# Patient Record
Sex: FEMALE | Race: OTHER | HISPANIC OR LATINO
[De-identification: names, ages, dates, MRNs, and addresses within clinical notes are randomized per-mention and may not be internally consistent; named-entity substitution may affect disease eponyms.]

---

## 2019-03-13 ENCOUNTER — APPOINTMENT (OUTPATIENT)
Dept: GASTROENTEROLOGY | Facility: CLINIC | Age: 69
End: 2019-03-13
Payer: COMMERCIAL

## 2019-03-13 VITALS
TEMPERATURE: 98.7 F | OXYGEN SATURATION: 98 % | WEIGHT: 124 LBS | DIASTOLIC BLOOD PRESSURE: 74 MMHG | SYSTOLIC BLOOD PRESSURE: 132 MMHG | BODY MASS INDEX: 24.35 KG/M2 | HEART RATE: 85 BPM | HEIGHT: 60 IN

## 2019-03-13 DIAGNOSIS — Z86.018 PERSONAL HISTORY OF OTHER BENIGN NEOPLASM: ICD-10-CM

## 2019-03-13 DIAGNOSIS — Z78.9 OTHER SPECIFIED HEALTH STATUS: ICD-10-CM

## 2019-03-13 DIAGNOSIS — Z86.39 PERSONAL HISTORY OF OTHER ENDOCRINE, NUTRITIONAL AND METABOLIC DISEASE: ICD-10-CM

## 2019-03-13 DIAGNOSIS — Z87.09 PERSONAL HISTORY OF OTHER DISEASES OF THE RESPIRATORY SYSTEM: ICD-10-CM

## 2019-03-13 DIAGNOSIS — Z85.048 PERSONAL HISTORY OF OTHER MALIGNANT NEOPLASM OF RECTUM, RECTOSIGMOID JUNCTION, AND ANUS: ICD-10-CM

## 2019-03-13 DIAGNOSIS — Z86.79 PERSONAL HISTORY OF OTHER DISEASES OF THE CIRCULATORY SYSTEM: ICD-10-CM

## 2019-03-13 PROBLEM — Z00.00 ENCOUNTER FOR PREVENTIVE HEALTH EXAMINATION: Status: ACTIVE | Noted: 2019-03-13

## 2019-03-13 PROCEDURE — 99213 OFFICE O/P EST LOW 20 MIN: CPT

## 2019-03-13 RX ORDER — ATORVASTATIN CALCIUM 80 MG/1
TABLET, FILM COATED ORAL
Refills: 0 | Status: ACTIVE | COMMUNITY

## 2019-03-13 RX ORDER — VITAMIN B COMPLEX
CAPSULE ORAL
Refills: 0 | Status: ACTIVE | COMMUNITY

## 2019-03-13 RX ORDER — MIRTAZAPINE 7.5 MG/1
7.5 TABLET, FILM COATED ORAL
Refills: 0 | Status: ACTIVE | COMMUNITY

## 2019-03-13 RX ORDER — LOSARTAN POTASSIUM AND HYDROCHLOROTHIAZIDE 12.5; 1 MG/1; MG/1
TABLET ORAL
Refills: 0 | Status: ACTIVE | COMMUNITY

## 2019-03-13 RX ORDER — OXYBUTYNIN CHLORIDE 5 MG/1
5 TABLET ORAL
Refills: 0 | Status: ACTIVE | COMMUNITY

## 2019-03-13 RX ORDER — MULTIVITAMIN/IRON/FOLIC ACID 18MG-0.4MG
TABLET ORAL
Refills: 0 | Status: ACTIVE | COMMUNITY

## 2019-03-13 RX ORDER — FAMOTIDINE 40 MG/1
40 TABLET, FILM COATED ORAL
Refills: 0 | Status: ACTIVE | COMMUNITY

## 2019-03-13 RX ORDER — MONTELUKAST 10 MG/1
TABLET, FILM COATED ORAL
Refills: 0 | Status: ACTIVE | COMMUNITY

## 2019-03-13 RX ORDER — POLYETHYLENE GLYCOL 3350, SODIUM CHLORIDE, POTASSIUM CHLORIDE, SODIUM BICARBONATE, AND SODIUM SULFATE 240; 5.84; 2.98; 6.72; 22.72 G/4L; G/4L; G/4L; G/4L; G/4L
240 POWDER, FOR SOLUTION ORAL
Qty: 1 | Refills: 0 | Status: ACTIVE | COMMUNITY
Start: 2019-03-13 | End: 1900-01-01

## 2019-05-07 ENCOUNTER — RESULT REVIEW (OUTPATIENT)
Age: 69
End: 2019-05-07

## 2019-05-07 ENCOUNTER — APPOINTMENT (OUTPATIENT)
Dept: GASTROENTEROLOGY | Facility: HOSPITAL | Age: 69
End: 2019-05-07

## 2019-05-07 ENCOUNTER — OUTPATIENT (OUTPATIENT)
Dept: OUTPATIENT SERVICES | Facility: HOSPITAL | Age: 69
LOS: 1 days | End: 2019-05-07
Payer: COMMERCIAL

## 2019-05-07 DIAGNOSIS — R10.32 LEFT LOWER QUADRANT PAIN: ICD-10-CM

## 2019-05-07 DIAGNOSIS — Z85.048 PERSONAL HISTORY OF OTHER MALIGNANT NEOPLASM OF RECTUM, RECTOSIGMOID JUNCTION, AND ANUS: ICD-10-CM

## 2019-05-07 PROCEDURE — 45380 COLONOSCOPY AND BIOPSY: CPT

## 2019-05-07 PROCEDURE — 88305 TISSUE EXAM BY PATHOLOGIST: CPT | Mod: 26

## 2019-05-07 PROCEDURE — 88305 TISSUE EXAM BY PATHOLOGIST: CPT

## 2019-05-08 LAB — SURGICAL PATHOLOGY STUDY: SIGNIFICANT CHANGE UP

## 2019-05-14 ENCOUNTER — MESSAGE (OUTPATIENT)
Age: 69
End: 2019-05-14

## 2019-06-05 ENCOUNTER — APPOINTMENT (OUTPATIENT)
Dept: GASTROENTEROLOGY | Facility: CLINIC | Age: 69
End: 2019-06-05
Payer: COMMERCIAL

## 2019-06-05 VITALS
BODY MASS INDEX: 24.35 KG/M2 | WEIGHT: 124 LBS | SYSTOLIC BLOOD PRESSURE: 147 MMHG | DIASTOLIC BLOOD PRESSURE: 78 MMHG | HEIGHT: 60 IN | OXYGEN SATURATION: 97 % | TEMPERATURE: 98.4 F | HEART RATE: 74 BPM

## 2019-06-05 PROCEDURE — 99213 OFFICE O/P EST LOW 20 MIN: CPT

## 2019-06-05 NOTE — HISTORY OF PRESENT ILLNESS
[None] : had no significant interval events [Nausea] : denies nausea [Vomiting] : denies vomiting [Diarrhea] : denies diarrhea [Constipation] : denies constipation [Yellow Skin Or Eyes (Jaundice)] : denies jaundice [Rectal Pain] : denies rectal pain [Heartburn] : heartburn [Abdominal Pain] : abdominal pain [Abdominal Swelling] : abdominal swelling [GERD] : gastroesophageal reflux disease [Wt Gain ___ Lbs] : no recent weight gain [Wt Loss ___ Lbs] : no recent weight loss [Hiatus Hernia] : no hiatus hernia [Peptic Ulcer Disease] : no peptic ulcer disease [Pancreatitis] : no pancreatitis [Cholelithiasis] : no cholelithiasis [Kidney Stone] : no kidney stone [Inflammatory Bowel Disease] : no inflammatory bowel disease [Irritable Bowel Syndrome] : no irritable bowel syndrome [Diverticulitis] : no diverticulitis [Alcohol Abuse] : no alcohol abuse [Malignancy] : no malignancy [Abdominal Surgery] : no abdominal surgery [Appendectomy] : no appendectomy [Cholecystectomy] : no cholecystectomy [de-identified] : The patient states that she is feeling anxious. The patient denies any jaundice or pruritus.  The patient complains of chronic lower back pain. The patient complains of abdominal pain.  The patient describes the abdominal pain as a crampy, intermittent lower abdominal discomfort that is nonradiating in nature.  The abdominal pain is unrelated to meals or passing gas or having bowel movements.  The abdominal pain is described as being mild in nature.  The abdominal pain occurs at night and in the morning.  The abdominal pain can occur at any time.   The abdominal pain has never awakened the patient from sleep.  The abdominal pain is not relieved with medication.  The abdominal pain is associated with abdominal gas and bloating.  The patient denies any nausea or vomiting.  The patient complains of gastroesophageal reflux disease but denies any dysphagia.  The gastroesophageal reflux disease is worse after meals and late at night and in the early morning. The gastroesophageal reflux disease is improved with proton pump inhibitors, H2 blockers and antacids.   The patient complains of atypical chest pain but denies any shortness of breath or palpitations.  The patient was recently evaluated by her cardiologist in December 2018.  According to the patient, the stress test was unremarkable.  The chest pain is described as a pressure, intermittent substernal discomfort that is nonradiating in nature.  The patient denies any diaphoresis. The chest pain is described as being 5 out of 10 in intensity.  The chest pain can occur with ambulation.  The chest pain is worse during the day with exertion.  The chest pain is worse after certain meals (Plantains) .  The chest pain is unrelated to passing gas.  The chest pain has never awakened the patient from sleep.  The patient denies any constipation or diarrhea.  The patient has 1 to 2 bowel movements a day.  The patient denies a change in bowel habits.  The patient denies a change in caliber of stool.  The patient denies having mucus discharge with the bowel movements.  The patient complains of 1 episode of lower GI bleeding that resolved spontaneously.  She currently denies any bright red blood per rectum, melena or hematemesis.  The lower GI bleeding is associated with hemorrhoids.  The patient denies any rectal pain or rectal pruritus.  The patient complains of weight loss but denies any anorexia.  The patient admits to losing 6 pounds over the past 6 months.  She denies any fevers or chills.  The patient had a colonoscopy to the terminal ileum performed at the [facility] GI endoscopy suite on May 9, 2019.  The colonoscopy to the terminal ileum revealed mild cecal erythema, mild left-sided diverticulosis, mild radiation proctitis and small internal hemorrhoids and hypertrophied anal papilla versus anal polyps. No polyps, masses, or AVMs noted. Random biopsies were taken of the terminal ileum and cecum to assess for microscopic colitis the pathology revealed unremarkable small bowel mucosa and colonic mucosa with preserved crypt architecture with acute and chronic inflammatory cells no evidence of cryptitis or crypt abscesses. The patient tolerated the procedure well.  The patient is being followed by Dr. Washburn of oncology for the anal cancer, s/p chemotherapy and radiation therapy.  The patient is s/p radiatiion and chemotherapy (capecitabine) therapy for the anal (squamous cell carcinoma) cancer (stage T1N0M0).  The patient previously complained of ano-rectal discomfort, s/p radiation therapy.  The patient is be ng followed by Dr. Colleen Washburn of oncology and Dr. Ghassan Hess and Dr. Gelacio Bunch of radiation oncology.  The patient was also being evaluated by her gynecologist, Dr. Sasha Bal.  The perianal pain is much less on Calmoseptine cream.  The patient wastreated with a course of ciprofloxacin.  The urinalysis and urine culture were negative.  The patient was previously treated with iridium 200 mg 3 times a day, zinc oxide cream on the perianal skin and a lidocaine/Aquaphor mixture a temporary relief.  The patient had an upper endoscopy and flexible sigmoidoscopy to the splenic flexure performed in the office on February 16 2018.  The upper endoscopy revealed small hiatal hernia and mild diffuse bile gastritis.   The pathology revealed distal esophagus with squamous mucosa with changes suggestive of mild reflux, gastroesophageal reflux disease with no evidence of eosinophilic esophagtis or fungal microorganism, gastric antral mucosa with chronic inactive gastritis with no evidence of intestinal metaplasia or dysplasia that was negative for Helicobacter pylori, gastric body mucosa with chronic inactive gastritis with no evidence of intestinal metaplasia or dysplasia that was negative for Helicobacter pylori and duodenal mucosa with chronic active duodenitis with no evidence of intraepithelial lymphocytosis, celiac disease, Giardia or parasites.  The flexible sigmoidoscopy to the splenic flexure revealed rectal polyp, mild left side d diverticulosis, mild rectal erythema suggestive of mild proctitis and internal hemorrhoids.  The anal polyp was snared and removed.  There was no bleeding post polypectomy.  Random biopsies were also taken of the rectum to assess for proctitis.  There were no other polyps, masses or AVMs noted. 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  The patient had a colonoscopy to the terminal ileum performed at the office on January 26, 2016.  The colonoscopic findings revealed mild left sided diverticulosis and large internal hemorrhoids.  There were no polyps, masses, AVMs or colitis noted.  The patient tolerated the procedures well.  The patient denies any family history of GI problems.

## 2019-06-05 NOTE — ASSESSMENT
[FreeTextEntry1] : Abdominal Pain: The patient complains of abdominal pain. The patient is to avoid nonsteroidal anti-inflammatory drugs and aspirin.  I recommend a trial of Phazyme 1 tablet PO 3 times a day for 3 months for the symptoms.\par Dyspepsia: The patient complains of dyspeptic symptoms.  The patient was advised to abide by an anti-gas diet.  The patient was given a pamphlet for anti-gas.  The patient and I reviewed the anti-gas diet at length. The patient is to start on a trial of Phazyme one tablet 3 times a day p.r.n. abdominal pain and gas.\par GERD: The patient was advised to avoid late-night meals and dietary indiscretions.  The patient was advised to avoid fried and fatty foods.  The patient was advised to abide by an anti-GERD diet. The patient was given a pamphlet for anti-GERD.  The patient and I reviewed the anti-GERD diet at length. I recommend a trial of Pepcid 40 mg twice a day x 3 months for the symptoms.\par Atypical Chest Pain: The patient complains of atypical chest pain of unclear etiology.  The patient was advised to follow up with the PMD regarding evaluation for the atypical chest pain. The patient was told of possible etiologies such as cardiac, pulmonary, GI, musculoskeletal, stress and other causes for the atypical chest pain.  The patient agrees and will follow-up with the PMD. \par Rectal Bleeding: The patient had episodes of rectal bleeding.  The etiology of the rectal bleeding is unclear.  I recommend a trial of Anusol HC suppositories one per rectum QHS and Anusol HC 2.5% cream apply to affected area twice a day PRN hemorrhoidal bleeding or pain.  I recommend a trial of Calmoseptine cream apply to affected area twice a day for rectal discomfort. I recommend Tucks pads for the hemorrhoids.  I recommend starting Sitz baths twice a day for the hemorrhoids.  I recommend avoid wearing tight underwear and use boxers. I recommend avoid touching the perineal area.  I recommend a surgical evaluation for possible band ligation of the hemorrhoids. The patient was given the name of Dr. Bryan Briscoe for possible surgery. \par Diverticulosis: I recommend a high-fiber diet and avoid seeds. The patient is to consider a trial of Metamucil once a day for fiber supplementation. The patient is to also consider a trial of a probiotic such as Align once a day.\par Internal Hemorrhoids: The patient is to consider a trial of Anusol H. C. suppositories one per rectum nightly and Anusol HC2 .5% cream apply to affected area twice a day p.r.n. hemorrhoidal bleeding or pain.\par Radiation Proctitis:  The patient has a history of radiation proctitis.  If the bleeding persists, consider a trial of Canasa suppositories 1000mg QHS for possible radiation proctitis.\par The patient had hypertrophied anal papilla versus anal polyps noted on recent colonoscopy. I recommend a surgical evaluation with Dr. Bryan Briscoe.  The patient agrees and will follow-up.\par History of Colonic Polyp: The patient has a history of colonic polyps.  I recommend a repeat colonoscopy to reassess for colonic polyps. The patient was told of the risks and benefits of the procedure.  The patient was told of the risks of perforation, emergency surgery, bleeding, infections and missed lesions.  The patient agreed and will schedule for the procedure. The patient is to be n.p.o. after midnight and bowel prep was given.  The patient is to return for the procedure.\par Hiatal Hernia:  The patient was advised to avoid late-night meals and dietary indiscretions.  The patient was advised to avoid fried and fatty foods.  The patient was advised to abide by an anti-GERD diet. The patient was given a pamphlet for anti-GERD.  The patient and I reviewed the anti-GERD diet at length.\par Gastritis: The patient has a history of gastritis. The patient is to avoid nonsteroidal anti-inflammatory drugs and aspirin.  I recommend a trial of Famotidine 40 mg twice a day for 3 months for the symptoms.\par Duodenitis: The patient is to avoid nonsteroidal anti-inflammatory drugs and aspirin.\par The flexible sigmoidoscopy to the splenic flexure revealed rectal polyp, mild left sided diverticulosis, mild rectal erythema suggestive of mild proctitis and internal hemorrhoids.  The rectal polyp was snared and removed.  There was no bleeding post polypectomy.  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  The patient is being followed by Dr. 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  The patient is also being followed by radiation oncology, Dr. Ghassan Hess and Dr. Gelacio Bunch, s/pradiation therapy.  The patient completed the course of chemotherapy and radiation therapy for close to 1 year with no issues.  The patient was also evaluated by Dr. Bryan Briscoe for the squamous cell carcinoma.    The patient agrees to followup s/p treatment.\par I recommend follow up with gynecologist, oncologist and radiation oncologist.\par I recommend continue on a trial of Calmoseptine cream apply to affected area twice a day for the anal discomfort. \par I recommend continue on a trial of Famotidine 40 mg twice a day for 3 months for the symptoms. \par Follow-up: The patient is to follow-up in the office in 2 to 3 months to reassess the symptoms. The patient was told to call the office if any further problems.\par \par \par \par

## 2019-12-05 RX ORDER — FAMOTIDINE 40 MG/1
40 TABLET, FILM COATED ORAL TWICE DAILY
Qty: 180 | Refills: 3 | Status: ACTIVE | COMMUNITY
Start: 2019-03-13

## 2019-12-05 NOTE — HISTORY OF PRESENT ILLNESS
[None] : had no significant interval events [de-identified] : The patient states that she is feeling better. The patient denies any jaundice or pruritus.  The patient complains of chronic lower back pain. The patient complains of occasional abdominal pain.  The patient describes the abdominal pain as a crampy, intermittent left sided abdominal discomfort that is nonradiating in nature.  The abdominal pain is unrelated to meals or passing gas or having bowel movements.   The abdominal pain is described as being mild to moderate in nature.  The abdominal pain occurs during the day.  The abdominal pain can occur at any time.   The abdominal pain has never awakened the patient from sleep.  The abdominal pain is relieved with certain medication such as proton pump inhibitors, H2 blockers and antacids.  The abdominal pain is associated with abdominal gas and bloating.  The patient any nausea or vomiting.  The patient complains of gastroesophageal reflux disease but denies any dysphagia.  The gastroesophageal reflux disease is worse after meals and late at night and in the early morning. The gastroesophageal reflux disease is improved with proton pump inhibitors, H2 blockers and antacids.   The patient denies any atypical chest pain, shortness of breath or palpitations.  The patient denies any diaphoresis. The patient denies any constipation or diarrhea.  The patient has 1 to 2 bowel movements a day.    The patient denies a change in bowel habits.  The patient denies a change in caliber of stool.  The patient denies having mucus discharge with the bowel movements.  The patient complains of dark stools but denies any bright red blood per rectum, melena or hematemesis.  The patient complains of rectal pruritus but denies any rectal pain.  The patient denies any weight loss or anorexia.  She denies any fevers or chills.  The patient is being followed by Dr. Washburn of oncology for the anal cancer, s/p chemotherapy and radiation therapy.  The patient is s/p radiatiion and chemotherapy (capecitabine) therapy for the anal (squamous cell carcinoma) cancer (stage T1N0M0).  The patient previously complained of ano-rectal discomfort, s/p radiation therapy.  The patient is be ng followed by Dr. Colleen Washburn of oncology and Dr. Ghassan Hess and Dr. Gelacio Bunch of radiation oncology.  The patient was also being evaluated by her gynecologist, Dr. Sasha Bal.  The perianal pain is much less on Calmoseptine cream.  The patient was treated with a course of ciprofloxacin.The urinalysis and urine culture were negative.  The patient was previously treated with iridium 200 mg 3 times a day, zinc oxide cream on the perianal skin and a lidocaine/Aquaphor mixture a temporary relief.  The patient had an upper endoscopy and flexible sigmoidoscopy to the splenic flexure performed in the office on February 16 2018.  The upper endoscopy revealed small hiatal hernia and mild diffuse bile gastritis.   The pathology revealed distal esophagus with squamous mucosa with changes suggestive of mild reflux, gastroesophageal reflux disease with no evidence of eosinophilic esophagtis or fungal microorganism, gastric antral mucosa with chronic inactive gastritis with no evidence of intestinal metaplasia or dysplasia that was negative for Helicobacter pylori, gastric body mucosa with chronic inactive gastritis with no evidence of intestinal metaplasia or dysplasia that was negative for Helicobacter pylori and duodenal mucosa with chronic active duodenitis with no evidence of intraepithelial lymphocytosis, celiac disease, Giardia or parasites.  The flexible sigmoidoscopy to the splenic flexure revealed rectal polyp, mild left side d diverticulosis, mild rectal erythema suggestive of mild proctitis and internal hemorrhoids.  The anal polyp was snared and removed.  There was no bleeding post polypectomy.  Random biopsies were also taken of the rectum to assess for proctitis.  There were no other polyps, masses or AVMs noted. 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  The patient had a colonoscopy to the terminal ileum performed at the office on January 26, 2016.  The colonoscopic findings revealed mild left sided diverticulosis and large internal hemorrhoids.  There were no polyps, masses, AVMs or colitis noted.  The patient tolerated the procedures well.  The patient denies any family history of GI problems.

## 2019-12-05 NOTE — HISTORY OF PRESENT ILLNESS
[None] : had no significant interval events [de-identified] : The patient states that she is feeling better. The patient denies any jaundice or pruritus.  The patient complains of chronic lower back pain. The patient complains of occasional abdominal pain.  The patient describes the abdominal pain as a crampy, intermittent left sided abdominal discomfort that is nonradiating in nature.  The abdominal pain is unrelated to meals or passing gas or having bowel movements.   The abdominal pain is described as being mild to moderate in nature.  The abdominal pain occurs during the day.  The abdominal pain can occur at any time.   The abdominal pain has never awakened the patient from sleep.  The abdominal pain is relieved with certain medication such as proton pump inhibitors, H2 blockers and antacids.  The abdominal pain is associated with abdominal gas and bloating.  The patient any nausea or vomiting.  The patient complains of gastroesophageal reflux disease but denies any dysphagia.  The gastroesophageal reflux disease is worse after meals and late at night and in the early morning. The gastroesophageal reflux disease is improved with proton pump inhibitors, H2 blockers and antacids.   The patient denies any atypical chest pain, shortness of breath or palpitations.  The patient denies any diaphoresis. The patient denies any constipation or diarrhea.  The patient has 1 to 2 bowel movements a day.    The patient denies a change in bowel habits.  The patient denies a change in caliber of stool.  The patient denies having mucus discharge with the bowel movements.  The patient complains of dark stools but denies any bright red blood per rectum, melena or hematemesis.  The patient complains of rectal pruritus but denies any rectal pain.  The patient denies any weight loss or anorexia.  She denies any fevers or chills.  The patient is being followed by Dr. Washburn of oncology for the anal cancer, s/p chemotherapy and radiation therapy.  The patient is s/p radiatiion and chemotherapy (capecitabine) therapy for the anal (squamous cell carcinoma) cancer (stage T1N0M0).  The patient previously complained of ano-rectal discomfort, s/p radiation therapy.  The patient is be ng followed by Dr. Colleen Washburn of oncology and Dr. Ghassan Hess and Dr. Gelacio Bunch of radiation oncology.  The patient was also being evaluated by her gynecologist, Dr. Sasha Bal.  The perianal pain is much less on Calmoseptine cream.  The patient was treated with a course of ciprofloxacin.The urinalysis and urine culture were negative.  The patient was previously treated with iridium 200 mg 3 times a day, zinc oxide cream on the perianal skin and a lidocaine/Aquaphor mixture a temporary relief.  The patient had an upper endoscopy and flexible sigmoidoscopy to the splenic flexure performed in the office on February 16 2018.  The upper endoscopy revealed small hiatal hernia and mild diffuse bile gastritis.   The pathology revealed distal esophagus with squamous mucosa with changes suggestive of mild reflux, gastroesophageal reflux disease with no evidence of eosinophilic esophagtis or fungal microorganism, gastric antral mucosa with chronic inactive gastritis with no evidence of intestinal metaplasia or dysplasia that was negative for Helicobacter pylori, gastric body mucosa with chronic inactive gastritis with no evidence of intestinal metaplasia or dysplasia that was negative for Helicobacter pylori and duodenal mucosa with chronic active duodenitis with no evidence of intraepithelial lymphocytosis, celiac disease, Giardia or parasites.  The flexible sigmoidoscopy to the splenic flexure revealed rectal polyp, mild left side d diverticulosis, mild rectal erythema suggestive of mild proctitis and internal hemorrhoids.  The anal polyp was snared and removed.  There was no bleeding post polypectomy.  Random biopsies were also taken of the rectum to assess for proctitis.  There were no other polyps, masses or AVMs noted. 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  The patient had a colonoscopy to the terminal ileum performed at the office on January 26, 2016.  The colonoscopic findings revealed mild left sided diverticulosis and large internal hemorrhoids.  There were no polyps, masses, AVMs or colitis noted.  The patient tolerated the procedures well.  The patient denies any family history of GI problems.

## 2020-01-17 ENCOUNTER — APPOINTMENT (OUTPATIENT)
Dept: GASTROENTEROLOGY | Facility: CLINIC | Age: 70
End: 2020-01-17
Payer: COMMERCIAL

## 2020-01-17 VITALS
DIASTOLIC BLOOD PRESSURE: 67 MMHG | HEART RATE: 74 BPM | HEIGHT: 60 IN | SYSTOLIC BLOOD PRESSURE: 131 MMHG | WEIGHT: 124 LBS | OXYGEN SATURATION: 98 % | TEMPERATURE: 98.3 F | BODY MASS INDEX: 24.35 KG/M2

## 2020-01-17 PROCEDURE — 99213 OFFICE O/P EST LOW 20 MIN: CPT

## 2020-01-17 RX ORDER — MESALAMINE 1000 MG/1
1000 SUPPOSITORY RECTAL
Qty: 30 | Refills: 3 | Status: ACTIVE | COMMUNITY
Start: 2020-01-17 | End: 1900-01-01

## 2020-01-17 RX ORDER — FAMOTIDINE 40 MG/1
40 TABLET, FILM COATED ORAL
Qty: 60 | Refills: 3 | Status: ACTIVE | COMMUNITY
Start: 2020-01-17 | End: 1900-01-01

## 2020-01-17 NOTE — HISTORY OF PRESENT ILLNESS
[None] : had no significant interval events [Vomiting] : denies vomiting [Diarrhea] : denies diarrhea [Nausea] : denies nausea [Yellow Skin Or Eyes (Jaundice)] : denies jaundice [Constipation] : denies constipation [Rectal Pain] : denies rectal pain [Wt Loss ___ Lbs] : recent [unfilled] ~Upound(s) weight loss [Abdominal Pain] : abdominal pain [Heartburn] : heartburn [Abdominal Swelling] : abdominal swelling [GERD] : gastroesophageal reflux disease [Hiatus Hernia] : hiatus hernia [Malignancy] : malignancy [Wt Gain ___ Lbs] : no recent weight gain [Peptic Ulcer Disease] : no peptic ulcer disease [Pancreatitis] : no pancreatitis [Cholelithiasis] : no cholelithiasis [Kidney Stone] : no kidney stone [Inflammatory Bowel Disease] : no inflammatory bowel disease [Diverticulitis] : no diverticulitis [Irritable Bowel Syndrome] : no irritable bowel syndrome [Alcohol Abuse] : no alcohol abuse [Appendectomy] : no appendectomy [Cholecystectomy] : no cholecystectomy [Abdominal Surgery] : no abdominal surgery [de-identified] : The patient states that she is feeling uncomfortable x 1month. The patient denies any jaundice or pruritus.  The patient complains of chronic lower back pain. The patient complains of abdominal pain.  The patient describes the abdominal pain as a crampy, intermittent lower abdominal discomfort that occasionally radiates to the back.  The abdominal pain is unrelated to meals or passing gas or having bowel movements.  The abdominal pain is described as being mild in nature.  The abdominal pain occurs at night and in the morning.  The abdominal pain can occur at any time.   The abdominal pain has never awakened the patient from sleep.  The abdominal pain is slightly relieved with certain medication such as Gas X.  The abdominal pain is associated with abdominal gas and bloating.  The patient denies any nausea or vomiting.  The patient complains of occasional gastroesophageal reflux disease but denies any dysphagia.  The gastroesophageal reflux disease is worse after meals and late at night and in the early morning. The gastroesophageal reflux disease is improved with proton pump inhibitors, H2 blockers and antacids.   The patient complains of palpitations but denies any atypical chest pain or shortness of breath.  The patient was evaluated by her cardiologist for the atypical chest pain.  The patient states that the stress test and cardiac workup was stable.   The patient denies any diaphoresis. The patient denies any constipation or diarrhea.  The patient has 1 to 2 bowel movements a day.  The patient denies a change in bowel habits.  The patient denies a change in caliber of stool.  The patient admits to having mucus discharge with the bowel movements.  The patient complains of lower GI bleeding but denies any melena or hematemesis.  The lower GI bleeding is associated with radiation proctitis.  The patient denies any rectal pain or rectal pruritus.  The patient complains of weight loss but denies any anorexia.  The patient admits to losing 5 pounds over the past 3 months.  She denies any fevers or chills.  The patient had a colonoscopy to the terminal ileum performed at the [facility] GI endoscopy suite on May 9, 2019. The colonoscopy to the terminal ileum revealed mild cecal erythema, mild left-sided diverticulosis, mild radiation proctitis and small internal hemorrhoids and hypertrophied anal papilla versus anal polyps. No polyps, masses, or AVMs noted. Random biopsies were taken of the terminal ileum and cecum to assess for microscopic colitis the pathology revealed unremarkable small bowel mucosa and colonic mucosa with preserved crypt architecture with acute and chronic inflammatory cells no evidence of cryptitis or crypt abscesses. The patient tolerated the procedure well. The patient is being followed by Dr. Washburn of oncology for the anal cancer, s/p chemotherapy and radiation therapy. The patient is s/p radiatiion and chemotherapy (capecitabine) therapy for the anal (squamous cell carcinoma) cancer (stage T1N0M0). The patient previously complained of ano-rectal discomfort, s/p radiation therapy. The patient is being followed by Dr. Colleen Washburn of oncology and Dr. Ghassan Hess and Dr. Gelacio Bunch of radiation oncology.  The patient was recently evaluated by Dr. Bryan Briscoe of surgery.  According to the patient, the anal biopsies was stable.   The patient was also being evaluated by her gynecologist, Dr. Sasha Bal. The perianal pain is much less on Calmoseptine cream. The patient was treated with a course of ciprofloxacin. The urinalysis and urine culture were negative. The patient was previously treated with iridium 200 mg 3 times a day, zinc oxide cream on the perianal skin and a lidocaine/Aquaphor mixture a temporary relief. The patient had an upper endoscopy and flexible sigmoidoscopy to the splenic flexure performed in the office on February 16 2018. The upper endoscopy revealed small hiatal hernia and mild diffuse bile gastritis. The pathology revealed distal esophagus with squamous mucosa with changes suggestive of mild reflux, gastroesophageal reflux disease with no evidence of eosinophilic esophagtis or fungal microorganism, gastric antral mucosa with chronic inactive gastritis with no evidence of intestinal metaplasia or dysplasia that was negative for Helicobacter pylori, gastric body mucosa with chronic inactive gastritis with no evidence of intestinal metaplasia or dysplasia that was negative for Helicobacter pylori and duodenal mucosa with chronic active duodenitis with no evidence of intraepithelial lymphocytosis, celiac disease, Giardia or parasites. The flexible sigmoidoscopy to the splenic flexure revealed rectal polyp, mild left side d diverticulosis, mild rectal erythema suggestive of mild proctitis and internal hemorrhoids. The anal polyp was snared and removed. There was no bleeding post polypectomy. Random biopsies were also taken of the rectum to assess for proctitis. There were no other polyps, masses or AVMs noted. 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 The patient had a colonoscopy to the terminal ileum performed at the office on January 26, 2016. The colonoscopic findings revealed mild left sided diverticulosis and large internal hemorrhoids. There were no polyps, masses, AVMs or colitis noted. The patient tolerated the procedures well. The patient denies any family history of GI problems.

## 2020-01-17 NOTE — ASSESSMENT
[FreeTextEntry1] : Abdominal Pain: The patient complains of abdominal pain. The patient is to avoid nonsteroidal anti-inflammatory drugs and aspirin.  I recommend a trial of Phazyme 1 tablet PO 3 times a day for 3 months for the symptoms.\par Dyspepsia: The patient complains of dyspeptic symptoms.  The patient was advised to abide by an anti-gas diet.  The patient was given a pamphlet for anti-gas.  The patient and I reviewed the anti-gas diet at length. The patient is to start on a trial of Phazyme one tablet 3 times a day p.r.n. abdominal pain and gas.\par GERD: The patient was advised to avoid late-night meals and dietary indiscretions.  The patient was advised to avoid fried and fatty foods.  The patient was advised to abide by an anti-GERD diet. The patient was given a pamphlet for anti-GERD.  The patient and I reviewed the anti-GERD diet at length. I recommend a trial of Pepcid 40 mg twice a day x 3 months for the symptoms.\par Diverticulosis: I recommend a high-fiber diet and avoid seeds. The patient is to consider a trial of Metamucil once a day for fiber supplementation. The patient is to also consider a trial of a probiotic such as Align once a day.\par Internal Hemorrhoids: The patient is to consider a trial of Anusol H. C. suppositories one per rectum nightly and Anusol HC2.5% cream apply to affected area twice a day p.r.n. hemorrhoidal bleeding or pain.\par Radiation Proctitis: The patient has a history of radiation proctitis. If the bleeding persists, consider a trial of Canasa suppositories 1000mg QHS for possible radiation proctitis.\par The patient had hypertrophied anal papilla versus anal polyps noted on recent colonoscopy. The patient had a surgical evaluation with Dr. Bryan Briscoe. The patient was told that the pathology revealed evidence of radiation proctitis.  The patient agrees and will follow-up.\par History of Colonic Polyp: The patient has a history of colonic polyps. I recommend a repeat colonoscopy in 3 years to reassess for colonic polyps unless symptomatic.  The patient agreed and will follow-up for the procedure. \par Hiatal Hernia: The patient was advised to avoid late-night meals and dietary indiscretions. The patient was advised to avoid fried and fatty foods. The patient was advised to abide by an anti-GERD diet. The patient was given a pamphlet for anti-GERD. The patient and I reviewed the anti-GERD diet at length.\par Gastritis: The patient has a history of gastritis. The patient is to avoid nonsteroidal anti-inflammatory drugs and aspirin. I recommend a trial of Famotidine 40 mg twice a day for 3 months for the symptoms.\par Duodenitis: The patient is to avoid nonsteroidal anti-inflammatory drugs and aspirin.\par The flexible sigmoidoscopy to the splenic flexure revealed rectal polyp, mild left sided diverticulosis, mild rectal erythema suggestive of mild proctitis and internal hemorrhoids. The rectal polyp was snared and removed. There was no bleeding post polypectomy. 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 The patient is being followed by Dr. 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 The patient is also being followed by radiation oncology, Dr. Ghassan Hess and Dr. Gelacio Bunch, s/p radiation therapy. The patient completed the course of chemotherapy and radiation therapy for close to 1 year with no issues. The patient was also evaluated by Dr. Bryan Briscoe for the squamous cell carcinoma. The patient agrees to followup s/p treatment.\par I recommend continue follow up with gynecologist, oncologist and radiation oncologist.\par I recommend continue on a trial of Calmoseptine cream apply to affected area twice a day for the anal discomfort. \par I recommend continue on a trial of Famotidine 40 mg twice a day for 3 months for the symptoms. \par Follow-up: The patient is to follow-up in the office in  3 months to reassess the symptoms. The patient was told to call the office if any further problems.\par \par \par

## 2020-05-20 ENCOUNTER — APPOINTMENT (OUTPATIENT)
Dept: GASTROENTEROLOGY | Facility: CLINIC | Age: 70
End: 2020-05-20
Payer: COMMERCIAL

## 2020-05-20 VITALS
HEART RATE: 81 BPM | WEIGHT: 121 LBS | BODY MASS INDEX: 23.75 KG/M2 | HEIGHT: 60 IN | OXYGEN SATURATION: 98 % | TEMPERATURE: 99 F | DIASTOLIC BLOOD PRESSURE: 74 MMHG | SYSTOLIC BLOOD PRESSURE: 131 MMHG

## 2020-05-20 DIAGNOSIS — R19.7 DIARRHEA, UNSPECIFIED: ICD-10-CM

## 2020-05-20 PROCEDURE — 99214 OFFICE O/P EST MOD 30 MIN: CPT

## 2020-05-20 RX ORDER — FAMOTIDINE 40 MG/1
40 TABLET, FILM COATED ORAL TWICE DAILY
Qty: 180 | Refills: 3 | Status: ACTIVE | COMMUNITY
Start: 2020-05-20 | End: 1900-01-01

## 2020-05-20 NOTE — HISTORY OF PRESENT ILLNESS
[None] : had no significant interval events [Nausea] : denies nausea [Vomiting] : denies vomiting [Constipation] : denies constipation [Abdominal Pain] : denies abdominal pain [Rectal Pain] : denies rectal pain [Yellow Skin Or Eyes (Jaundice)] : denies jaundice [Wt Loss ___ Lbs] : recent [unfilled] ~Upound(s) weight loss [Diarrhea] : diarrhea [Heartburn] : heartburn [GERD] : gastroesophageal reflux disease [Abdominal Swelling] : abdominal swelling [Wt Gain ___ Lbs] : no recent weight gain [Hiatus Hernia] : no hiatus hernia [Peptic Ulcer Disease] : no peptic ulcer disease [Cholelithiasis] : no cholelithiasis [Pancreatitis] : no pancreatitis [Inflammatory Bowel Disease] : no inflammatory bowel disease [Kidney Stone] : no kidney stone [Irritable Bowel Syndrome] : no irritable bowel syndrome [Diverticulitis] : no diverticulitis [Alcohol Abuse] : no alcohol abuse [Abdominal Surgery] : no abdominal surgery [Malignancy] : no malignancy [Cholecystectomy] : no cholecystectomy [Appendectomy] : no appendectomy [de-identified] : The MRCP with and without IV contrast performed on February 21, 2020 revealed small (8 mm) pancreatic head cystic lesion. Also noted were 2 small liver cysts.  The CAT scan of the abdomen and pelvis with IV contrast performed on February 17, 2020 revealed limited CT assessment of heterogeneous density in the head and uncinate process of the pancreas especially for underlying small cystic lesions in the background of fatty infiltration.  The abdominal ultrasound performed on February 6, 2020 revealed a limited study due to the patient's body habitus.  There was a suspicious 0.8 x 0.5 x 0.9 cm hypoechoic mass in the head of the pancreas. Also noted was increased echogenicity within the liver indicative of fatty infiltration or diffuse hepatocellular disease. The spleen was not satisfactorily visualized due to the patient's body habitus. The pelvic ultrasound performed on February 6, 2020 revealed no uterine or adnexal masses. [de-identified] : The patient states that she is feeling better.  The patient denies any exposure to COVID 19 infection.  The patient denies any viral illness.  The patient denies any prior exposure to hepatitis A, B or C.  The patient denies any large doses of nonsteroidal anti-inflammatory drugs or acetaminophen.   The patient denies sharing needles, razors, nail clippers, nail files, scissors, et cetera.  The patient denies any EtOH abuse, cocaine use or intravenous drug use.   The patient denies any prior blood transfusions, sexual indiscretions, tattoos or piercing.  The patient admits to having prior surgery.  The history of surgery is significant for a prior uterine myomectomy and .  The patient denies any family history of GI or liver problems.   The patient denies any jaundice or pruritus.  The patient complains of chronic lower back pain. The patient denies any abdominal pain.  The patient complains of abdominal gas and bloating.  The patient denies any nausea or vomiting.  The patient complains of dry mouth and occasional gastroesophageal reflux disease but denies any dysphagia.  The gastroesophageal reflux disease is worse after meals and late at night and in the early morning. The gastroesophageal reflux disease is improved with proton pump inhibitors, H2 blockers and antacids.   The patient complains of atypical chest pain and shortness of breath secondary to asthma but denies any palpitations.  The chest pain is described as a pressure, intermittent substernal discomfort that is nonradiating in nature.  The patient denies any diaphoresis. The chest pain is described as being 5 to 6 out of 10 in intensity.  The chest pain can occur at any time.  The chest pain is worse at night and early morning.  The chest pain is worse after meals and improves with passing gas or having bowel movements.  The chest pain is unrelated to meals and passing gas.  The chest pain has never awakened the patient from sleep.  The patient complains of occasional diarrhea but denies any constipation.  The patient has 1 to 3 bowel movements a day.  The patient complains of a change in bowel habits.  The patient complains of a change in caliber of stool.   The diarrhea is described as soft in nature.  The patient denies having mucus discharge with the bowel movements.  The patient previously complained of lower GI bleeding that resolved spontaneously.  She currently denies any bright red blood per rectum, melena or hematemesis.  The lower GI bleeding was associated with diarrhea and radiation proctitis.  The patient denies any rectal pain or rectal pruritus.  The patient complains of weight loss but denies any anorexia.  The patient admits to losing 10 pounds over the past 2 years.  She attributed the weight loss to change in diet and history of cancer.  She denies any fevers or chills.  The MRCP with and without IV contrast performed on 2020 revealed small (8 mm) pancreatic head cystic lesion. Also noted were 2 small liver cysts.  The CAT scan of the abdomen and pelvis with IV contrast performed on 2020 revealed limited CT assessment of heterogeneous density in the head and uncinate process of the pancreas especially for underlying small cystic lesions in the background of fatty infiltration.  The abdominal ultrasound performed on 2020 revealed a limited study due to the patient's body habitus.  There was a suspicious 0.8 x 0.5 x 0.9 cm hypoechoic mass in the head of the pancreas. Also noted was increased echogenicity within the liver indicative of fatty infiltration or diffuse hepatocellular disease. The spleen was not satisfactorily visualized due to the patient's body habitus. The pelvic ultrasound performed on 2020 revealed no uterine or adnexal masses. The patient had a colonoscopy to the terminal ileum performed at the [facility] GI endoscopy suite on May 9, 2019. The colonoscopy to the terminal ileum revealed mild cecal erythema, mild left-sided diverticulosis, mild radiation proctitis and small internal hemorrhoids and hypertrophied anal papilla versus anal polyps. No polyps, masses, or AVMs noted. Random biopsies were taken of the terminal ileum and cecum to assess for microscopic colitis the pathology revealed unremarkable small bowel mucosa and colonic mucosa with preserved crypt architecture with acute and chronic inflammatory cells no evidence of cryptitis or crypt abscesses. The patient tolerated the procedure well. The patient is being followed by Dr. Washubrn of oncology for the anal cancer, s/p chemotherapy and radiation therapy. The patient is s/p radiatiion and chemotherapy (capecitabine) therapy for the anal (squamous cell carcinoma) cancer (stage T1N0M0). The patient previously complained of ano-rectal discomfort, s/p radiation therapy. The patient is being followed by Dr. Colleen Washburn of oncology and Dr. Ghassan Hess and Dr. Gelacio Bunch of radiation oncology. The patient is being followed by Dr. Bryan Briscoe of surgery. According to the patient, the anal biopsies was stable. The patient was also being evaluated by her gynecologist, Dr. Sasha Bal. The perianal pain previously improved on Calmoseptine cream. The patient was treated with a course of ciprofloxacin. The urinalysis and urine culture were negative. The patient was previously treated with iridium 200 mg 3 times a day, zinc oxide cream on the perianal skin and a lidocaine/Aquaphor mixture a temporary relief. The patient had an upper endoscopy and flexible sigmoidoscopy to the splenic flexure performed in the office on 2018. The upper endoscopy revealed small hiatal hernia and mild diffuse bile gastritis. The pathology revealed distal esophagus with squamous mucosa with changes suggestive of mild reflux, gastroesophageal reflux disease with no evidence of eosinophilic esophagtis or fungal microorganism, gastric antral mucosa with chronic inactive gastritis with no evidence of intestinal metaplasia or dysplasia that was negative for Helicobacter pylori, gastric body mucosa with chronic inactive gastritis with no evidence of intestinal metaplasia or dysplasia that was negative for Helicobacter pylori and duodenal mucosa with chronic active duodenitis with no evidence of intraepithelial lymphocytosis, celiac disease, Giardia or parasites. The flexible sigmoidoscopy to the splenic flexure revealed rectal polyp, mild left side d diverticulosis, mild rectal erythema suggestive of mild proctitis and internal hemorrhoids. The anal polyp was snared and removed. There was no bleeding post polypectomy. Random biopsies were also taken of the rectum to assess for proctitis. There were no other polyps, masses or AVMs noted. 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 The patient had a colonoscopy to the terminal ileum performed at the office on 2016. The colonoscopic findings revealed mild left sided diverticulosis and large internal hemorrhoids. There were no polyps, masses, AVMs or colitis noted. The patient tolerated the procedures well. The patient denies any family history of GI problems.

## 2020-05-20 NOTE — ASSESSMENT
[FreeTextEntry1] : Dyspepsia: The patient complains of dyspeptic symptoms.  The patient was advised to abide by an anti-gas diet.  The patient was given a pamphlet for anti-gas.  The patient and I reviewed the anti-gas diet at length. The patient is to start on a trial of Phazyme one tablet 3 times a day p.r.n. abdominal pain and gas.\par GERD: The patient was advised to avoid late-night meals and dietary indiscretions.  The patient was advised to avoid fried and fatty foods.  The patient was advised to abide by an anti-GERD diet. The patient was given a pamphlet for anti-GERD.  The patient and I reviewed the anti-GERD diet at length. I recommend a trial of Pepcid 40 mg twice a day x 3 months for the symptoms.\par Atypical Chest Pain: The patient complains of atypical chest pain of unclear etiology.  The patient was advised to follow up with the PMD and cardiologist regarding evaluation for the atypical chest pain. The patient was told of possible etiologies such as cardiac, pulmonary, GI, musculoskeletal, stress and other causes for the atypical chest pain.  The patient agrees and will follow-up with the PMD and cardiologist. \par Diarrhea: The patient complains of diarrhea.  I recommend a low residue diet. The patient is to avoid fiber supplementation. The patient is to consider starting a trial of a probiotic such as Align once a day.   If the symptoms persist, the patient may require sending stool studies for C+S, O+P x3, and C. difficile to assess for an infectious etiology of the diarrhea.  The symptoms are worse after meals.  If the symptoms persist, the patient may require a trial of cholestyramine one packet once a day for possible bile induced diarrhea.   The patient agreed and will follow-up to reassess the symptoms.  \par Diverticulosis: I recommend a low fiber diet and avoid seeds. The patient is to avoid Metamucil or fiber supplementation. The patient is to also consider a trial of a probiotic such as Align once a day.\par Internal Hemorrhoids: The patient is to consider a trial of Anusol H. C. suppositories one per rectum nightly and Anusol HC2.5% cream apply to affected area twice a day p.r.n. hemorrhoidal bleeding or pain.\par Radiation Proctitis: The patient has a history of radiation proctitis. If the bleeding persists, consider a trial of Canasa suppositories 1000mg QHS for possible radiation proctitis.\par The patient had hypertrophied anal papilla versus anal polyps noted on recent colonoscopy. The patient had a surgical evaluation with Dr. Bryan Briscoe. The patient was told that the pathology revealed evidence of radiation proctitis. The patient agrees and will follow-up.\par History of Colonic Polyp: The patient has a history of colonic polyps. I recommend a repeat colonoscopy in 3 years to reassess for colonic polyps unless symptomatic. The patient agreed and will follow-up for the procedure. \par Hiatal Hernia: The patient was advised to avoid late-night meals and dietary indiscretions. The patient was advised to avoid fried and fatty foods. The patient was advised to abide by an anti-GERD diet. The patient was given a pamphlet for anti-GERD. The patient and I reviewed the anti-GERD diet at length.\par Gastritis: The patient has a history of gastritis. The patient is to avoid nonsteroidal anti-inflammatory drugs and aspirin. I recommend continue on a trial of Famotidine 40 mg twice a day for 3 months for the symptoms.\par Duodenitis: The patient is to avoid nonsteroidal anti-inflammatory drugs and aspirin.\par The flexible sigmoidoscopy to the splenic flexure revealed rectal polyp, mild left sided diverticulosis, mild rectal erythema suggestive of mild proctitis and internal hemorrhoids. The rectal polyp was snared and removed. There was no bleeding post polypectomy. 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 The patient is being followed by Dr. 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 The patient is also being followed by radiation oncology, Dr. Ghassan Hess and Dr. Gelacio Bunch, s/p radiation therapy. The patient completed the course of chemotherapy and radiation therapy for close to 1 year with no issues. The patient was also evaluated by Dr. Bryan Briscoe for the squamous cell carcinoma. The patient agrees to followup s/p treatment.\par I recommend continue follow up with gynecologist, oncologist and radiation oncologist.\par Fatty Liver:  The patient had an imaging study suggestive of fatty infiltration of the liver.  The patient denies any jaundice or pruritus.  The patient denies any alcohol use.  The patient denies taking large doses of nonsteroidal anti-inflammatory drugs or acetaminophen.  The findings are suggestive of fatty liver.  The patient and I had a long discussion regarding the risks of fatty liver and possible progression to cirrhosis.  The patient was told of the possible increased risk of developing liver failure, cirrhosis, ascites, GI bleeding secondary to varices, hepatic encephalopathy, bleeding tendencies and liver cancer.  The patient was told of the importance of follow-up.  The patient was advised to follow up every 6 months for blood work and imaging studies. The patient agreed and will follow up. The patient was advised to lose weight. I recommend a trial of vitamin E supplementation for the fatty liver.  If the liver enzymes remain elevated, the patient may require a trial of Pioglitazone for the fatty liver. I recommend avoid alcohol and hepato-toxic agents.  The patient was also advised to avoid NSAIDs, Acetominophen and any other hepatotoxic drugs. The patient was also advised not to share needles, razors, scissors, nail clippers, etc.. The patient is to continue close follow-up in our office for blood work and exams.  If the liver enzymes remain elevated, the patient may require a CT guided liver biopsy to assess the liver parenchyma for possible treatment.  We had a long discussion regarding the risks and benefits of the procedure.  The patient was told of the risks of bleeding, perforation, infections, emergency surgery and missing lesions.  The patient agreed and will follow-up to reassess the symptoms.  \par Pancreatic Cystic Lesion:  The patient was recently diagnosed with a pancreatic cystic lesion on recent CAT scan of the abdomen and pelvis with IV contrast, abdominal ultrasound and MRCP with and without IV contrast.  The patient denies any jaundice, pruritus or back pain.  The patient also denies any abdominal pain.  The patient denies any prior history of EtOH abuse.  I recommend an MRI of the pancreas in one year to reassess the pancreatic cystic lesions.  The patient and I had a long discussion regarding the potential risks of pancreatic cysts progressing to pancreatic cancer.  If the MRI reveals a change in the pancreatic cystic lesions, the patient may require an endoscopic ultrasound of pancreas with possible fine-needle aspiration to better assess the pancreatic cystic lesions.  The patient is aware of the importance for followup.  The patient agrees and will followup.\par I recommend continue on a trial of Calmoseptine cream apply to affected area twice a day for the anal discomfort. \par I recommend continue on a trial of Famotidine 40 mg twice a day for 3 months for the symptoms. \par Follow-up: The patient is to follow-up in the office in 3 months to reassess the symptoms. The patient was told to call the office if any further problems.\par \par \par \par

## 2021-01-22 ENCOUNTER — APPOINTMENT (OUTPATIENT)
Dept: GASTROENTEROLOGY | Facility: CLINIC | Age: 71
End: 2021-01-22
Payer: COMMERCIAL

## 2021-01-22 ENCOUNTER — TRANSCRIPTION ENCOUNTER (OUTPATIENT)
Age: 71
End: 2021-01-22

## 2021-01-22 VITALS
BODY MASS INDEX: 23.16 KG/M2 | DIASTOLIC BLOOD PRESSURE: 70 MMHG | WEIGHT: 118 LBS | OXYGEN SATURATION: 96 % | HEART RATE: 83 BPM | SYSTOLIC BLOOD PRESSURE: 128 MMHG | TEMPERATURE: 97.5 F | HEIGHT: 60 IN

## 2021-01-22 PROCEDURE — 99072 ADDL SUPL MATRL&STAF TM PHE: CPT

## 2021-01-22 PROCEDURE — 99212 OFFICE O/P EST SF 10 MIN: CPT

## 2021-01-22 NOTE — ASSESSMENT
[FreeTextEntry1] : Dyspepsia: The patient complains of dyspeptic symptoms.  The patient was advised to abide by an anti-gas diet.  The patient was given a pamphlet for anti-gas.  The patient and I reviewed the anti-gas diet at length. The patient is to start on a trial of Phazyme one tablet 3 times a day p.r.n. abdominal pain and gas.\par GERD: The patient was advised to avoid late-night meals and dietary indiscretions.  The patient was advised to avoid fried and fatty foods.  The patient was advised to abide by an anti-GERD diet. The patient was given a pamphlet for anti-GERD.  The patient and I reviewed the anti-GERD diet at length. I recommend a trial of Pepcid 40 mg twice a day x 3 months for the symptoms.\par Atypical Chest Pain: The patient complains of atypical chest pain of unclear etiology.  The patient was advised to follow up with the PMD and cardiologist regarding evaluation for the atypical chest pain. The patient was told of possible etiologies such as cardiac, pulmonary, GI, musculoskeletal, stress and other causes for the atypical chest pain.  The patient agrees and will follow-up with the PMD and cardiologist. \par Imaging Study: I recommend an imaging study to assess the symptoms. I recommend an abdominal ultrasound to assess the liver parenchyma and for liver lesions.\par Diverticulosis: I recommend a low fiber diet and avoid seeds. The patient is to avoid Metamucil or fiber supplementation. The patient is to also consider a trial of a probiotic such as Align once a day.\par Internal Hemorrhoids: The patient is to consider a trial of Anusol H. C. suppositories one per rectum nightly and Anusol HC2.5% cream apply to affected area twice a day p.r.n. hemorrhoidal bleeding or pain.\par Radiation Proctitis: The patient has a history of radiation proctitis. If the bleeding persists, consider a trial of Canasa suppositories 1000mg QHS for possible radiation proctitis.\par The patient had hypertrophied anal papilla versus anal polyps noted on recent colonoscopy. The patient had a surgical evaluation with Dr. Bryan Briscoe. The patient was told that the pathology revealed evidence of radiation proctitis. The patient agrees and will follow-up.\par History of Colonic Polyp: The patient has a history of colonic polyps. I recommend a repeat colonoscopy in 1 year to reassess for colonic polyps unless symptomatic. The patient agreed and will follow-up for the procedure. \par Hiatal Hernia: The patient was advised to avoid late-night meals and dietary indiscretions. The patient was advised to avoid fried and fatty foods. The patient was advised to abide by an anti-GERD diet. The patient was given a pamphlet for anti-GERD. The patient and I reviewed the anti-GERD diet at length.\par Gastritis: The patient has a history of gastritis. The patient is to avoid nonsteroidal anti-inflammatory drugs and aspirin. I recommend continue on a trial of Famotidine 40 mg twice a day for 3 months for the symptoms.\par Duodenitis: The patient is to avoid nonsteroidal anti-inflammatory drugs and aspirin.\par The flexible sigmoidoscopy to the splenic flexure revealed rectal polyp, mild left sided diverticulosis, mild rectal erythema suggestive of mild proctitis and internal hemorrhoids. The rectal polyp was snared and removed. There was no bleeding post polypectomy. 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 The patient is being followed by Dr. 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 The patient is also being followed by radiation oncology, Dr. Ghassan Hess and Dr. Gelacio Bunch, s/p radiation therapy. The patient completed the course of chemotherapy and radiation therapy for close to 1 year with no issues. The patient was also evaluated by Dr. Bryan Briscoe for the squamous cell carcinoma. The patient agrees to followup s/p treatment.\par I recommend continue follow up with gynecologist, oncologist and radiation oncologist.\par Fatty Liver: The patient had an imaging study suggestive of fatty infiltration of the liver. The patient denies any jaundice or pruritus. The patient denies any alcohol use. The patient denies taking large doses of nonsteroidal anti-inflammatory drugs or acetaminophen. The findings are suggestive of fatty liver. The patient and I had a long discussion regarding the risks of fatty liver and possible progression to cirrhosis. The patient was told of the possible increased risk of developing liver failure, cirrhosis, ascites, GI bleeding secondary to varices, hepatic encephalopathy, bleeding tendencies and liver cancer. The patient was told of the importance of follow-up. The patient was advised to follow up every 6 months for blood work and imaging studies. The patient agreed and will follow up. The patient was advised to lose weight. I recommend a trial of vitamin E supplementation for the fatty liver. If the liver enzymes remain elevated, the patient may require a trial of Pioglitazone for the fatty liver. I recommend avoid alcohol and hepato-toxic agents. The patient was also advised to avoid NSAIDs, Acetaminophen and any other hepatotoxic drugs. The patient was also advised not to share needles, razors, scissors, nail clippers, etc.. The patient is to continue close follow-up in our office for blood work and exams. If the liver enzymes remain elevated, the patient may require a CT guided liver biopsy to assess the liver parenchyma for possible treatment. We had a long discussion regarding the risks and benefits of the procedure. The patient was told of the risks of bleeding, perforation, infections, emergency surgery and missing lesions. The patient agreed and will follow-up to reassess the symptoms. \par History of Pancreatic Cystic Lesion: The patient was recently diagnosed with a pancreatic cystic lesion on recent CAT scan of the abdomen and pelvis with IV contrast, abdominal ultrasound and MRCP with and without IV contrast. The patient denies any jaundice, pruritus or back pain. The patient also denies any abdominal pain. The patient denies any prior history of EtOH abuse. I recommend an MRI of the pancreas in one year to reassess the pancreatic cystic lesions. The patient and I had a long discussion regarding the potential risks of pancreatic cysts progressing to pancreatic cancer. If the MRI reveals a change in the pancreatic cystic lesions, the patient may require an endoscopic ultrasound of pancreas with possible fine-needle aspiration to better assess the pancreatic cystic lesions. The patient is aware of the importance for followup. The patient agrees and will followup.\par I recommend continue on a trial of Calmoseptine cream apply to affected area twice a day for the anal discomfort. \par I recommend continue on a trial of Famotidine 40 mg twice a day for 3 months for the symptoms. \par Follow-up: The patient is to follow-up in the office in 3 months to reassess the symptoms. The patient was told to call the office if any further problems.\par \par \par \par

## 2021-01-22 NOTE — HISTORY OF PRESENT ILLNESS
[None] : had no significant interval events [Nausea] : denies nausea [Vomiting] : denies vomiting [Diarrhea] : denies diarrhea [Yellow Skin Or Eyes (Jaundice)] : denies jaundice [Abdominal Pain] : denies abdominal pain [Rectal Pain] : denies rectal pain [Heartburn] : heartburn [Constipation] : constipation [Abdominal Swelling] : abdominal swelling [GERD] : gastroesophageal reflux disease [Wt Gain ___ Lbs] : no recent weight gain [Wt Loss ___ Lbs] : no recent weight loss [Hiatus Hernia] : no hiatus hernia [Peptic Ulcer Disease] : no peptic ulcer disease [Pancreatitis] : no pancreatitis [Cholelithiasis] : no cholelithiasis [Kidney Stone] : no kidney stone [Inflammatory Bowel Disease] : no inflammatory bowel disease [Irritable Bowel Syndrome] : no irritable bowel syndrome [Diverticulitis] : no diverticulitis [Alcohol Abuse] : no alcohol abuse [Malignancy] : no malignancy [Abdominal Surgery] : no abdominal surgery [Appendectomy] : no appendectomy [Cholecystectomy] : no cholecystectomy [de-identified] : The patient denies any exposure to COVID 19 infection. The patient denies any viral illness. The patient denies any prior exposure to hepatitis A, B or C. The patient denies any large doses of nonsteroidal anti-inflammatory drugs or acetaminophen. The patient denies sharing needles, razors, nail clippers, nail files, scissors, et cetera. The patient denies any EtOH abuse, cocaine use or intravenous drug use. The patient denies any prior blood transfusions, sexual indiscretions, tattoos or piercing. The patient admits to having prior surgery. The history of surgery is significant for a prior uterine myomectomy and . The patient denies any family history of GI or liver problems. The patient states that she is feeling uncomfortable. The patient denies any jaundice or pruritus.  The patient complains of chronic lower back pain. The patient denies any abdominal pain.  The patient complains of abdominal gas and bloating.  The patient denies any nausea or vomiting.  The patient complains of gastroesophageal reflux disease but denies any dysphagia.  The gastroesophageal reflux disease is worse after meals and late at night and in the early morning. The gastroesophageal reflux disease is improved with proton pump inhibitors, H2 blockers and antacids.   The patient complains of atypical chest pain and palpitations but denies any shortness of breath.  The chest pain is described as a, crampy, intermittent right sided chest discomfort that is nonradiating in nature.  The patient denies any diaphoresis. The patient admits to occasional episodes of diaphoresis.  The chest pain is described as being 2 out of 10 in intensity.  The chest pain can occur at any time.  The chest pain is worse at night and early morning.  The chest pain improves with drinking water.  The chest pain is unrelated to meals and passing gas.  The chest pain has never awakened the patient from sleep.  The patient denies any constipation or diarrhea.  The patient has 1 to 2 bowel movements a day.  The patient denies a change in bowel habits.  The patient denies a change in caliber of stool.  The patient denies having mucus discharge with the bowel movements.  The patient complains of occasional dark stools but denies any bright red blood per rectum, melena or hematemesis.  The patient complains of rectal pruritus but denies any rectal pain.  The patient complains of weight loss but denies any anorexia.  The patient admits to losing 10 pounds over the past 1 year.  She attributed the weight loss to recent change in diet.  She denies any fevers or chills.  The blood work performed on 2020 revealed an elevated hemoglobin a1c of 5.8%.  The MRCP with and without IV contrast performed on 2020 revealed small (8 mm) pancreatic head cystic lesion. Also noted were 2 small liver cysts. The CAT scan of the abdomen and pelvis with IV contrast performed on 2020 revealed limited CT assessment of heterogeneous density in the head and uncinate process of the pancreas especially for underlying small cystic lesions in the background of fatty infiltration. The abdominal ultrasound performed on 2020 revealed a limited study due to the patient's body habitus. There was a suspicious 0.8 x 0.5 x 0.9 cm hypoechoic mass in the head of the pancreas. Also noted was increased echogenicity within the liver indicative of fatty infiltration or diffuse hepatocellular disease. The spleen was not satisfactorily visualized due to the patient's body habitus. The pelvic ultrasound performed on 2020 revealed no uterine or adnexal masses. The patient had a colonoscopy to the terminal ileum performed at the [facility] GI endoscopy suite on May 9, 2019. The colonoscopy to the terminal ileum revealed mild cecal erythema, mild left-sided diverticulosis, mild radiation proctitis and small internal hemorrhoids and hypertrophied anal papilla versus anal polyps. No polyps, masses, or AVMs noted.  The pathology revealed unremarkable small bowel mucosa and colonic mucosa with preserved crypt architecture with acute and chronic inflammatory cells no evidence of cryptitis or crypt abscesses. The patient tolerated the procedure well. The patient is being followed by Dr. Washburn of oncology for the anal cancer, s/p chemotherapy and radiation therapy. The patient is s/p radiatiion and chemotherapy (capecitabine) therapy for the anal (squamous cell carcinoma) cancer (stage T1N0M0). The patient previously complained of ano-rectal discomfort, s/p radiation therapy. The patient is being followed by Dr. Colleen Washburn of oncology and Dr. Ghassan Hess and Dr. Gelacio Bunch of radiation oncology. The patient is being followed by Dr. Bryan Briscoe of surgery. According to the patient, the anal biopsies was stable. The patient is being followed by her gynecologist, Dr. Sasha Bal. The perianal pain previously improved on Calmoseptine cream. The patient was treated with a course of ciprofloxacin. The urinalysis and urine culture were negative. The patient was previously treated with iridium 200 mg 3 times a day, zinc oxide cream on the perianal skin and a lidocaine/Aquaphor mixture a temporary relief. The patient had an upper endoscopy and flexible sigmoidoscopy to the splenic flexure performed in the office on 2018. The upper endoscopy revealed small hiatal hernia and mild diffuse bile gastritis. The pathology revealed distal esophagus with squamous mucosa with changes suggestive of mild reflux, gastroesophageal reflux disease with no evidence of eosinophilic esophagtis or fungal microorganism, gastric antral mucosa with chronic inactive gastritis with no evidence of intestinal metaplasia or dysplasia that was negative for Helicobacter pylori, gastric body mucosa with chronic inactive gastritis with no evidence of intestinal metaplasia or dysplasia that was negative for Helicobacter pylori and duodenal mucosa with chronic active duodenitis with no evidence of intraepithelial lymphocytosis, celiac disease, Giardia or parasites. The flexible sigmoidoscopy to the splenic flexure revealed rectal polyp, mild left side d diverticulosis, mild rectal erythema suggestive of mild proctitis and internal hemorrhoids. The anal polyp was snared and removed. There was no bleeding post polypectomy. Random biopsies were also taken of the rectum to assess for proctitis. There were no other polyps, masses or AVMs noted. 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 The patient tolerated the procedures well. The patient denies any family history of GI problems.  [de-identified] : (-) smoking, (-) ETOH, (-) IVDA\par

## 2021-02-24 ENCOUNTER — RESULT REVIEW (OUTPATIENT)
Age: 71
End: 2021-02-24

## 2021-02-24 ENCOUNTER — OUTPATIENT (OUTPATIENT)
Dept: OUTPATIENT SERVICES | Facility: HOSPITAL | Age: 71
LOS: 1 days | End: 2021-02-24
Payer: COMMERCIAL

## 2021-02-24 ENCOUNTER — APPOINTMENT (OUTPATIENT)
Dept: ULTRASOUND IMAGING | Facility: HOSPITAL | Age: 71
End: 2021-02-24

## 2021-02-24 ENCOUNTER — APPOINTMENT (OUTPATIENT)
Dept: ULTRASOUND IMAGING | Facility: HOSPITAL | Age: 71
End: 2021-02-24
Payer: COMMERCIAL

## 2021-02-24 DIAGNOSIS — K86.2 CYST OF PANCREAS: ICD-10-CM

## 2021-02-24 PROCEDURE — 76700 US EXAM ABDOM COMPLETE: CPT

## 2021-02-24 PROCEDURE — 76700 US EXAM ABDOM COMPLETE: CPT | Mod: 26

## 2021-03-30 ENCOUNTER — OUTPATIENT (OUTPATIENT)
Dept: OUTPATIENT SERVICES | Facility: HOSPITAL | Age: 71
LOS: 1 days | End: 2021-03-30
Payer: COMMERCIAL

## 2021-03-30 ENCOUNTER — APPOINTMENT (OUTPATIENT)
Dept: MRI IMAGING | Facility: HOSPITAL | Age: 71
End: 2021-03-30
Payer: COMMERCIAL

## 2021-03-30 DIAGNOSIS — K86.2 CYST OF PANCREAS: ICD-10-CM

## 2021-03-30 DIAGNOSIS — R93.89 ABNORMAL FINDINGS ON DIAGNOSTIC IMAGING OF OTHER SPECIFIED BODY STRUCTURES: ICD-10-CM

## 2021-03-30 PROCEDURE — 74183 MRI ABD W/O CNTR FLWD CNTR: CPT

## 2021-03-30 PROCEDURE — 74183 MRI ABD W/O CNTR FLWD CNTR: CPT | Mod: 26

## 2021-06-04 ENCOUNTER — APPOINTMENT (OUTPATIENT)
Dept: GASTROENTEROLOGY | Facility: CLINIC | Age: 71
End: 2021-06-04

## 2021-09-29 ENCOUNTER — APPOINTMENT (OUTPATIENT)
Dept: GASTROENTEROLOGY | Facility: CLINIC | Age: 71
End: 2021-09-29
Payer: COMMERCIAL

## 2021-09-29 VITALS
WEIGHT: 120 LBS | SYSTOLIC BLOOD PRESSURE: 108 MMHG | BODY MASS INDEX: 23.44 KG/M2 | TEMPERATURE: 97.9 F | OXYGEN SATURATION: 96 % | HEART RATE: 75 BPM | DIASTOLIC BLOOD PRESSURE: 63 MMHG

## 2021-09-29 PROCEDURE — 99215 OFFICE O/P EST HI 40 MIN: CPT

## 2021-09-29 RX ORDER — PANTOPRAZOLE 40 MG/1
40 TABLET, DELAYED RELEASE ORAL DAILY
Qty: 30 | Refills: 3 | Status: ACTIVE | COMMUNITY
Start: 2021-09-29 | End: 1900-01-01

## 2021-09-29 RX ORDER — SIMETHICONE 180 MG
180 CAPSULE ORAL 4 TIMES DAILY
Qty: 120 | Refills: 3 | Status: ACTIVE | COMMUNITY
Start: 2021-09-29 | End: 1900-01-01

## 2021-09-29 NOTE — HISTORY OF PRESENT ILLNESS
[None] : had no significant interval events [Vomiting] : denies vomiting [Yellow Skin Or Eyes (Jaundice)] : denies jaundice [Rectal Pain] : denies rectal pain [Wt Gain ___ Lbs] : recent [unfilled] ~Upound(s) weight gain [Heartburn] : heartburn [Nausea] : nausea [Diarrhea] : diarrhea [Constipation] : constipation [Abdominal Pain] : abdominal pain [Abdominal Swelling] : abdominal swelling [GERD] : gastroesophageal reflux disease [Hiatus Hernia] : hiatus hernia [Wt Loss ___ Lbs] : no recent weight loss [Peptic Ulcer Disease] : no peptic ulcer disease [Pancreatitis] : no pancreatitis [Cholelithiasis] : no cholelithiasis [Kidney Stone] : no kidney stone [Inflammatory Bowel Disease] : no inflammatory bowel disease [Irritable Bowel Syndrome] : no irritable bowel syndrome [Diverticulitis] : no diverticulitis [Alcohol Abuse] : no alcohol abuse [Malignancy] : no malignancy [Abdominal Surgery] : no abdominal surgery [Appendectomy] : no appendectomy [Cholecystectomy] : no cholecystectomy [de-identified] : The patient denies any exposure to COVID 19 infection. The patient denies any viral illness. The patient denies any prior exposure to hepatitis A, B or C. The patient denies any large doses of nonsteroidal anti-inflammatory drugs or acetaminophen. The patient denies sharing needles, razors, nail clippers, nail files, scissors, et cetera. The patient denies any EtOH abuse, cocaine use or intravenous drug use. The patient denies any prior blood transfusions, sexual indiscretions, tattoos or piercing. The patient admits to having prior surgery. The history of surgery is significant for a prior uterine myomectomy and . The patient denies any family history of GI or liver problems.  The patient states that she is feeling uncomfortable x 2 months. The patient denies any jaundice or pruritus.  The patient complains of chronic lower back pain. The patient complains of abdominal pain.  The patient describes the abdominal pain as a crampy, intermittent lower abdominal discomfort that occasionally radiates to the back.  The abdominal pain is unrelated to meals or passing gas or having bowel movements.  The abdominal pain is worse with movement.  The abdominal pain is described as mild to moderate in nature.  The abdominal pain occurs at night and in the morning.  The abdominal pain can occur at any time.   The abdominal pain has never awakened the patient from sleep.  The abdominal pain is not relieved with medication.  The abdominal pain is associated with abdominal gas and bloating.  The patient denies any nausea or vomiting.  The patient complains of occasional gastroesophageal reflux disease but denies any dysphagia.  The gastroesophageal reflux disease is worse after meals and late at night and in the early morning. The gastroesophageal reflux disease is improved with proton pump inhibitors, H2 blockers and antacids.   The patient denies any atypical chest pain, shortness of breath or palpitations.  The patient denies any diaphoresis. The patient complains of alternating diarrhea/constipation.  The patient has 1 to 3 bowel movements a day.  The patient complains of a change in bowel habits.  The patient complains of a change in caliber of stool.   The patient denies a change in bowel habits.  The patient denies a change in caliber of stool.  The diarrhea is described as soft in nature.  The patient denies having mucus discharge with the bowel movements.  The patient complains of occasional rectal bleeding but denies any melena or hematemesis.  The lower GI bleeding is associated with diarrhea and constipation.  The patient denies any rectal pain or rectal pruritus.  The patient currently denies any weight loss or anorexia.  The patient admits to gaining weight recently.  The patient previously complained of weight loss but denied any anorexia. The patient admitted to losing 10 pounds over 1 year. She attributed the weight loss to recent change in diet. She denies any fevers or chills. The abdominal ultrasound performed on 2021 revealed a 0.7 cm hypoechoic lesion in the pancreatic head. The MRI of the pancreas and MRCP with and without IV contrast performed on 2021 revealed a few small nonspecific cystic lesions in the pancreas with the largest measuring 0.7 cm in the pancreatic neck.  The CAT scan of the abdomen and pelvis with IV contrast performed on 2020 revealed limited CT assessment of heterogeneous density in the head and uncinate process of the pancreas especially for underlying small cystic lesions in the background of fatty infiltration. The blood work performed on 2021 revealed an elevated blood glucose of 117 mg/dL, normal liver enzymes with a total bilirubin of 0.3 mg/dL, a normal alkaline phosphatase/AST/ALT of 93/24/16 U/L, respectively, and elevated LDH level of 219 U/L, a normal CA 19-9 of 0.9 U/ml, and elevated CEA level of 6.9 ng/mL, no evidence of anemia with a hemoglobin/hematocrit level of 13.1/40.0, respectively. The patient had a colonoscopy to the terminal ileum performed at the [facility] GI endoscopy suite on May 9, 2019. The colonoscopy to the terminal ileum revealed mild cecal erythema, mild left-sided diverticulosis, mild radiation proctitis and small internal hemorrhoids and hypertrophied anal papilla versus anal polyps. No polyps, masses, or AVMs noted. The pathology revealed unremarkable small bowel mucosa and colonic mucosa with preserved crypt architecture with acute and chronic inflammatory cells no evidence of cryptitis or crypt abscesses. The patient tolerated the procedure well. The patient is being followed by Dr. Washburn of oncology for the anal cancer, s/p chemotherapy and radiation therapy. The patient is s/p radiatiion and chemotherapy (capecitabine) therapy for the anal (squamous cell carcinoma) cancer (stage T1N0M0). The patient previously complained of ano-rectal discomfort, s/p radiation therapy. The patient is being followed by Dr. Colleen Washburn of oncology and Dr. Ghassan Hess and Dr. Gelacio Bunch of radiation oncology. The patient is being followed by Dr. Bryan Briscoe of surgery. According to the patient, the anal biopsies was stable. The patient is being followed by her gynecologist, Dr. Sasha Bal. The perianal pain previously improved on Calmoseptine cream. The patient was treated with a course of ciprofloxacin. The urinalysis and urine culture were negative. The patient was previously treated with iridium 200 mg 3 times a day, zinc oxide cream on the perianal skin and a lidocaine/Aquaphor mixture a temporary relief. The patient had an upper endoscopy and flexible sigmoidoscopy to the splenic flexure performed in the office on 2018. The upper endoscopy revealed small hiatal hernia and mild diffuse bile gastritis. The pathology revealed distal esophagus with squamous mucosa with changes suggestive of mild reflux, gastroesophageal reflux disease with no evidence of eosinophilic esophagitis or fungal microorganism, gastric antral mucosa with chronic inactive gastritis with no evidence of intestinal metaplasia or dysplasia that was negative for Helicobacter pylori, gastric body mucosa with chronic inactive gastritis with no evidence of intestinal metaplasia or dysplasia that was negative for Helicobacter pylori and duodenal mucosa with chronic active duodenitis with no evidence of intraepithelial lymphocytosis, celiac disease, Giardia or parasites. The flexible sigmoidoscopy to the splenic flexure revealed rectal polyp, mild left side d diverticulosis, mild rectal erythema suggestive of mild proctitis and internal hemorrhoids. The anal polyp was snared and removed. There was no bleeding post polypectomy. Random biopsies were also taken of the rectum to assess for proctitis. There were no other polyps, masses or AVMs noted. 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 The patient tolerated the procedures well. The patient denies any family history of GI problems.  [de-identified] : (-) smoking, (-) ETOH, (-) IVDA\par

## 2021-09-29 NOTE — ASSESSMENT
[FreeTextEntry1] : Abdominal Pain: The patient complains of abdominal pain. The patient is to avoid nonsteroidal anti-inflammatory drugs and aspirin.  I recommend a low FOD-MAP diet.  I recommend a trial of Dicyclomine 10 mg tablet PO 3 times a day PRN for the abdominal pain.\par Dyspepsia: The patient complains of dyspeptic symptoms.  The patient was advised to abide by an anti-gas diet.  The patient was given a pamphlet for anti-gas.  The patient and I reviewed the anti-gas diet at length. The patient is to start on a trial of Phazyme one tablet 3 times a day p.r.n. abdominal pain and gas.\par GERD: The patient was advised to avoid late-night meals and dietary indiscretions.  The patient was advised to avoid fried and fatty foods.  The patient was advised to abide by an anti-GERD diet. The patient was given a pamphlet for anti-GERD.  The patient and I reviewed the anti-GERD diet at length. I recommend a trial of Pantoprazole 40 mg once a day x 3 months for the symptoms.\par Alternating Diarrhea/Constipation: The patient complains of alternating diarrhea/constipation.  I recommend a low residue diet. The patient is to avoid fiber supplementation. The patient is to consider starting a trial of a probiotic such as Align once a day.   If the symptoms persist, the patient may require a colonoscopy to assess for colitis versus other causes.  The patient was told of the risks and benefits of the procedure.  The patient was told of the risks of perforation, emergency surgery, bleeding, infections and missed lesions.  The patient agreed and will follow-up to reassess the symptoms.\par Abnormal Imaging Study: The abdominal ultrasound performed on February 24, 2021 revealed a 0.7 cm hypoechoic lesion in the pancreatic head. The MRI of the pancreas and MRCP with and without IV contrast performed on March 31, 2021 revealed a few small nonspecific cystic lesions in the pancreas with the largest measuring 0.7 cm in the pancreatic neck.  The blood work performed on February 25, 2021 revealed an elevated blood glucose of 117 mg/dl, an elevated LDH of 219 U/L, and elevated CEA of 6.9 ng/mL and a normal CA 19-9 of 0.9 U/ml.\par The abdominal ultrasound performed on February 24, 2021 revealed a 0.7 cm hypoechoic lesion in the pancreatic head. The MRI of the pancreas and MRCP with and without IV contrast performed on March 31, 2021 revealed a few small nonspecific cystic lesions in the pancreas with the largest measuring 0.7 cm in the pancreatic neck.  The blood work performed on February 25, 2021 revealed an elevated blood glucose of 117 mg/dl, an elevated LDH of 219 U/L, and elevated CEA of 6.9 ng/mL and a normal CA 19-9 of 0.9 U/ml.\par Diverticulosis: I recommend a low fiber diet and avoid seeds. The patient is to avoid Metamucil or fiber supplementation. The patient is to also consider a trial of a probiotic such as Align once a day.\par Internal Hemorrhoids: The patient is to consider a trial of Anusol H. C. suppositories one per rectum nightly and Anusol HC2.5% cream apply to affected area twice a day p.r.n. hemorrhoidal bleeding or pain.\par Radiation Proctitis: The patient has a history of radiation proctitis. If the bleeding persists, consider a trial of Canasa suppositories 1000mg QHS for possible radiation proctitis.\par The patient had hypertrophied anal papilla versus anal polyps noted on recent colonoscopy. The patient had a surgical evaluation with Dr. Bryan Briscoe. The patient was told that the pathology revealed evidence of radiation proctitis. The patient agrees and will follow-up.\par History of Colonic Polyp: The patient has a history of colonic polyps. I recommend a repeat colonoscopy in 1 year to reassess for colonic polyps unless symptomatic. The patient agreed and will follow-up for the procedure. \par Hiatal Hernia: The patient was advised to avoid late-night meals and dietary indiscretions. The patient was advised to avoid fried and fatty foods. The patient was advised to abide by an anti-GERD diet. The patient was given a pamphlet for anti-GERD. The patient and I reviewed the anti-GERD diet at length.\par Gastritis: The patient has a history of gastritis. The patient is to avoid nonsteroidal anti-inflammatory drugs and aspirin. I recommend a trial of Pantoprazole 40 mg once a day x 3 months for the symptoms.\par Duodenitis: The patient is to avoid nonsteroidal anti-inflammatory drugs and aspirin.\par Anal Squamous Cancer: The flexible sigmoidoscopy to the splenic flexure revealed rectal polyp, mild left sided diverticulosis, mild rectal erythema suggestive of mild proctitis and internal hemorrhoids. The rectal polyp was snared and removed. There was no bleeding post polypectomy. 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 The patient is being followed by Dr. 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 The patient was also being followed by radiation oncologist, Dr. Ghassan Hess and Dr. Gelacio Bunch, s/p radiation therapy. The patient completed the course of chemotherapy and radiation therapy for close to 1 year with no issues. The patient was also evaluated by Dr. Bryan Briscoe for the squamous cell carcinoma. The patient agrees to followup s/p treatment.  I recommend continue follow up with gynecologist, oncologist and radiation oncologist.\par Fatty Liver: The patient had an imaging study suggestive of fatty infiltration of the liver. The patient denies any jaundice or pruritus. The patient denies any alcohol use. The patient denies taking large doses of nonsteroidal anti-inflammatory drugs or acetaminophen. The findings are suggestive of fatty liver. The patient and I had a long discussion regarding the risks of fatty liver and possible progression to cirrhosis. The patient was told of the possible increased risk of developing liver failure, cirrhosis, ascites, GI bleeding secondary to varices, hepatic encephalopathy, bleeding tendencies and liver cancer. The patient was told of the importance of follow-up. The patient was advised to follow up every 6 months for blood work and imaging studies. The patient agreed and will follow up. The patient was advised to lose weight. I recommend a trial of vitamin E supplementation for the fatty liver. If the liver enzymes remain elevated, the patient may require a trial of Pioglitazone for the fatty liver. I recommend avoid alcohol and hepato-toxic agents. The patient was also advised to avoid NSAIDs, Acetaminophen and any other hepatotoxic drugs. The patient was also advised not to share needles, razors, scissors, nail clippers, etc.. The patient is to continue close follow-up in our office for blood work and exams. If the liver enzymes remain elevated, the patient may require a CT guided liver biopsy to assess the liver parenchyma for possible treatment. We had a long discussion regarding the risks and benefits of the procedure. The patient was told of the risks of bleeding, perforation, infections, emergency surgery and missing lesions. The patient agreed and will follow-up to reassess the symptoms. \par History of Pancreatic Cystic Lesion: The patient was found to have a pancreatic cystic lesion on prior CAT scan of the abdomen and pelvis with IV contrast, abdominal ultrasound and MRCP with and without IV contrast. The patient denies any jaundice, pruritus or back pain. The patient also denies any abdominal pain. The patient denies any prior history of EtOH abuse. The abdominal ultrasound performed on February 24, 2021 revealed a 0.7 cm hypoechoic lesion in the pancreatic head. The MRI of the pancreas and MRCP with and without IV contrast performed on March 31, 2021 revealed a few small nonspecific cystic lesions in the pancreas with the largest measuring 0.7 cm in the pancreatic neck.  The blood work performed on February 25, 2021 revealed an elevated blood glucose of 117 mg/dl, an elevated LDH of 219 U/L, and elevated CEA of 6.9 ng/mL and a normal CA 19-9 of 0.9 U/ml.  I recommend a repeat MRI of the pancreas in one year to reassess the pancreatic cystic lesions. The patient and I had a long discussion regarding the potential risks of pancreatic cysts progressing to pancreatic cancer. If the MRI reveals a change in the pancreatic cystic lesions, the patient may require an endoscopic ultrasound of pancreas with possible fine-needle aspiration to better assess the pancreatic cystic lesions. The patient is aware of the importance for followup. The patient agrees and will followup.\par I recommend continue on a trial of Calmoseptine cream apply to affected area twice a day for the anal discomfort. \par Follow-up: The patient is to follow-up in the office in 2 months to reassess the symptoms. The patient was told to call the office if any further problems.\par \par \par \par

## 2021-09-30 LAB — HEMOCCULT STL QL: NEGATIVE

## 2021-10-26 ENCOUNTER — NON-APPOINTMENT (OUTPATIENT)
Age: 71
End: 2021-10-26

## 2022-01-04 ENCOUNTER — RX RENEWAL (OUTPATIENT)
Age: 72
End: 2022-01-04

## 2022-01-12 ENCOUNTER — APPOINTMENT (OUTPATIENT)
Dept: GASTROENTEROLOGY | Facility: CLINIC | Age: 72
End: 2022-01-12
Payer: COMMERCIAL

## 2022-01-12 VITALS
OXYGEN SATURATION: 96 % | DIASTOLIC BLOOD PRESSURE: 75 MMHG | BODY MASS INDEX: 22.97 KG/M2 | WEIGHT: 117 LBS | HEIGHT: 60 IN | HEART RATE: 69 BPM | TEMPERATURE: 97 F | SYSTOLIC BLOOD PRESSURE: 129 MMHG

## 2022-01-12 DIAGNOSIS — R07.89 OTHER CHEST PAIN: ICD-10-CM

## 2022-01-12 DIAGNOSIS — R10.84 GENERALIZED ABDOMINAL PAIN: ICD-10-CM

## 2022-01-12 PROCEDURE — 99214 OFFICE O/P EST MOD 30 MIN: CPT

## 2022-01-12 RX ORDER — SIMETHICONE 180 MG
180 CAPSULE ORAL 4 TIMES DAILY
Qty: 120 | Refills: 3 | Status: ACTIVE | COMMUNITY
Start: 2022-01-12 | End: 1900-01-01

## 2022-01-12 RX ORDER — PANTOPRAZOLE 40 MG/1
40 TABLET, DELAYED RELEASE ORAL DAILY
Qty: 30 | Refills: 3 | Status: ACTIVE | COMMUNITY
Start: 2022-01-12 | End: 1900-01-01

## 2022-01-12 RX ORDER — MESALAMINE 1000 MG/1
1000 SUPPOSITORY RECTAL
Qty: 30 | Refills: 3 | Status: ACTIVE | COMMUNITY
Start: 2022-01-12 | End: 1900-01-01

## 2022-01-12 RX ORDER — HYDROCORTISONE 2.5% 25 MG/G
2.5 CREAM TOPICAL
Qty: 2 | Refills: 3 | Status: ACTIVE | COMMUNITY
Start: 2022-01-12 | End: 1900-01-01

## 2022-01-12 RX ORDER — POLYETHYLENE GLYCOL 3350 AND ELECTROLYTES WITH LEMON FLAVOR 236; 22.74; 6.74; 5.86; 2.97 G/4L; G/4L; G/4L; G/4L; G/4L
236 POWDER, FOR SOLUTION ORAL
Qty: 1 | Refills: 0 | Status: ACTIVE | COMMUNITY
Start: 2022-01-12 | End: 1900-01-01

## 2022-01-12 NOTE — HISTORY OF PRESENT ILLNESS
[None] : had no significant interval events [Nausea] : denies nausea [Vomiting] : denies vomiting [Diarrhea] : denies diarrhea [Constipation] : denies constipation [Yellow Skin Or Eyes (Jaundice)] : denies jaundice [Rectal Pain] : denies rectal pain [Heartburn] : heartburn [Abdominal Pain] : abdominal pain [Abdominal Swelling] : abdominal swelling [GERD] : gastroesophageal reflux disease [Hiatus Hernia] : hiatus hernia [Malignancy] : malignancy [Wt Gain ___ Lbs] : no recent weight gain [Wt Loss ___ Lbs] : no recent weight loss [Peptic Ulcer Disease] : no peptic ulcer disease [Pancreatitis] : no pancreatitis [Cholelithiasis] : no cholelithiasis [Kidney Stone] : no kidney stone [Inflammatory Bowel Disease] : no inflammatory bowel disease [Irritable Bowel Syndrome] : no irritable bowel syndrome [Diverticulitis] : no diverticulitis [Alcohol Abuse] : no alcohol abuse [Abdominal Surgery] : no abdominal surgery [Appendectomy] : no appendectomy [Cholecystectomy] : no cholecystectomy [de-identified] : The patient denies any exposure to COVID 19 infection. The patient denies any viral illness. The patient denies any prior exposure to hepatitis A, B or C. The patient denies any large doses of nonsteroidal anti-inflammatory drugs or acetaminophen. The patient denies sharing needles, razors, nail clippers, nail files, scissors, et cetera. The patient denies any EtOH abuse, cocaine use or intravenous drug use. The patient denies any prior blood transfusions, sexual indiscretions, tattoos or piercing. The patient admits to having prior surgery. The history of surgery is significant for a prior uterine myomectomy and . The patient denies any family history of GI or liver problems. The patient states that she is feeling uncomfortable. The patient denies any jaundice or pruritus.  The patient complains of chronic lower back pain. The patient complains of occasional abdominal pain.  The patient describes the abdominal pain as a crampy, intermittent midepigastric and periumbilical discomfort that is nonradiating in nature.  The abdominal pain is unrelated to meals or passing gas or having bowel movements.  The abdominal pain is described as mild in nature.  The abdominal pain occurs during the day.  The abdominal pain can occur at any time.   The abdominal pain has never awakened the patient from sleep.  The abdominal pain is not relieved with medication.  The abdominal pain is associated with abdominal gas and bloating.  The patient denies any nausea or vomiting.  The patient complains of gastroesophageal reflux disease but denies any dysphagia.  The gastroesophageal reflux disease is worse after meals and late at night and in the early morning. The gastroesophageal reflux disease is improved with proton pump inhibitors, H2 blockers and antacids.   The patient complains of atypical chest pain and palpitations but denies any shortness of breath.  The chest pain is described as a pressure, intermittent substernal discomfort that is nonradiating in nature.  The patient denies any diaphoresis. The patient admits to occasional episodes of diaphoresis.  The chest pain is described as 5 to 6 out of 10 in intensity.  The chest pain can occur at any time.  The chest pain is worse during the day.  The chest pain is unrelated to meals and passing gas.  The chest pain has never awakened the patient from sleep.  The patient denies any constipation or diarrhea.  The patient has 1 to 3 bowel movements a day. The patient denies a change in bowel habits.  The patient denies a change in caliber of stool.  The patient denies having mucus discharge with the bowel movements.  The patient complains of occasional dark stools but denies any bright red blood per rectum, melena or hematemesis.  The patient complains of occasional rectal pruritus but denies any rectal pain.  The patient currently denies any weight loss or anorexia.  The patient previously complained of weight loss but denied any anorexia. The patient admitted to losing 10 pounds over 1 year. She attributed the weight loss to recent change in diet. She denies any fevers or chills. The abdominal ultrasound performed on 2021 revealed a 0.7 cm hypoechoic lesion in the pancreatic head. The MRI of the pancreas and MRCP with and without IV contrast performed on 2021 revealed a few small nonspecific cystic lesions in the pancreas with the largest measuring 0.7 cm in the pancreatic neck. The CAT scan of the abdomen and pelvis with IV contrast performed on 2020 revealed limited CT assessment of heterogeneous density in the head and uncinate process of the pancreas especially for underlying small cystic lesions in the background of fatty infiltration. The blood work performed 21 revealed normal liver enzymes with a total bilirubin of 0.3 mg/dL, a normal alkaline phosphatase/AST/ALT of 78/24/15 U/L, respectively, a normal total cholesterol/triglyceride level of 165/73 mg/dL, respectively, no evidence of anemia with a hemoglobin/hematocrit level of 13.7/42.0, respectively and elevated hemoglobin A1c of 5.9%. The blood work performed on 2021 revealed an elevated blood glucose of 117 mg/dL, normal liver enzymes with a total bilirubin of 0.3 mg/dL, a normal alkaline phosphatase/AST/ALT of 93/24/16 U/L, respectively, and elevated LDH level of 219 U/L, a normal CA 19-9 of 0.9 U/ml, and elevated CEA level of 6.9 ng/mL, no evidence of anemia with a hemoglobin/hematocrit level of 13.1/40.0, respectively. The patient had a colonoscopy to the terminal ileum performed at the [facility] GI endoscopy suite on May 9, 2019. The colonoscopy to the terminal ileum revealed mild cecal erythema, mild left-sided diverticulosis, mild radiation proctitis and small internal hemorrhoids and hypertrophied anal papilla versus anal polyps. No polyps, masses, or AVMs noted. The pathology revealed unremarkable small bowel mucosa and colonic mucosa with preserved crypt architecture with acute and chronic inflammatory cells no evidence of cryptitis or crypt abscesses. The patient tolerated the procedure well. The patient is being followed by Dr. Washburn of oncology for the anal cancer, s/p chemotherapy and radiation therapy. The patient is s/p radiatiion and chemotherapy (capecitabine) therapy for the anal (squamous cell carcinoma) cancer (stage T1N0M0). The patient previously complained of ano-rectal discomfort, s/p radiation therapy. The patient is being followed by Dr. Colleen Washburn of oncology and Dr. Ghassan Hess and Dr. Gelacio Bunch of radiation oncology. The patient is being followed by Dr. Bryan Briscoe of surgery. According to the patient, the anal biopsies was stable. The patient is being followed by her gynecologist, Dr. Sasha Bal. The perianal pain previously improved on Calmoseptine cream. The patient was treated with a course of ciprofloxacin. The urinalysis and urine culture were negative. The patient was previously treated with iridium 200 mg 3 times a day, zinc oxide cream on the perianal skin and a lidocaine/Aquaphor mixture a temporary relief. The patient had an upper endoscopy and flexible sigmoidoscopy to the splenic flexure performed in the office on 2018. The upper endoscopy revealed small hiatal hernia and mild diffuse bile gastritis. The pathology revealed distal esophagus with squamous mucosa with changes suggestive of mild reflux, gastroesophageal reflux disease with no evidence of eosinophilic esophagitis or fungal microorganism, gastric antral mucosa with chronic inactive gastritis with no evidence of intestinal metaplasia or dysplasia that was negative for Helicobacter pylori, gastric body mucosa with chronic inactive gastritis with no evidence of intestinal metaplasia or dysplasia that was negative for Helicobacter pylori and duodenal mucosa with chronic active duodenitis with no evidence of intraepithelial lymphocytosis, celiac disease, Giardia or parasites. The flexible sigmoidoscopy to the splenic flexure revealed rectal polyp, mild left side d diverticulosis, mild rectal erythema suggestive of mild proctitis and internal hemorrhoids. The anal polyp was snared and removed. There was no bleeding post polypectomy. Random biopsies were also taken of the rectum to assess for proctitis. There were no other polyps, masses or AVMs noted. 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 The patient tolerated the procedures well. The patient denies any family history of GI problems.  [de-identified] : (-) smoking, (-) ETOH, (-) IVDA\par

## 2022-01-12 NOTE — ASSESSMENT
[FreeTextEntry1] : Abdominal Pain: The patient complains of abdominal pain. The patient is to avoid nonsteroidal anti-inflammatory drugs and aspirin. I recommend a trial of Pantoprazole 40 mg once a day for 3 months for the symptoms.  \par Dyspepsia: The patient complains of dyspeptic symptoms.  The patient was advised to continue to abide by an anti-gas (low FOD-MAP) diet.  The patient was previously given a pamphlet for anti-gas (low FOD-MAP).  The patient and I reviewed the anti-gas (low FOD-MAP)diet at length again. The patient is to continue on a trial of Simethicone one tablet 4 times a day p.r.n. abdominal pain and gas.\par GERD: The patient was advised to avoid late-night meals and dietary indiscretions.  The patient was advised to avoid fried and fatty foods.  The patient was advised to abide by an anti-GERD diet. The patient was given a pamphlet for anti-GERD.  The patient and I reviewed the anti-GERD diet at length. I recommend a trial of Pantoprazole 40 mg once a day x 3 months for the symptoms.\par Atypical Chest Pain: The patient complains of atypical chest pain of unclear etiology.  The patient was advised to follow up with the PMD and cardiologist regarding evaluation for the atypical chest pain. The patient was told of possible etiologies such as cardiac, pulmonary, GI, musculoskeletal, stress and other causes for the atypical chest pain.  The patient agrees and will follow-up with the PMD and cardiologist. \par Abnormal Imaging Study: The abdominal ultrasound performed on February 24, 2021 revealed a 0.7 cm hypoechoic lesion in the pancreatic head. The MRI of the pancreas and MRCP with and without IV contrast performed on March 31, 2021 revealed a few small nonspecific cystic lesions in the pancreas with the largest measuring 0.7 cm in the pancreatic neck. The abdominal ultrasound performed on February 24, 2021 revealed a 0.7 cm hypoechoic lesion in the pancreatic head. The MRI of the pancreas and MRCP with and without IV contrast performed on March 31, 2021 revealed a few small nonspecific cystic lesions in the pancreas with the largest measuring 0.7 cm in the pancreatic neck. The blood work performed on February 25, 2021 revealed an elevated blood glucose of 117 mg/dl, an elevated LDH of 219 U/L, and elevated CEA of 6.9 ng/mL and a normal CA 19-9 of 0.9 U/ml.\par Diverticulosis: I recommend a low fiber diet and avoid seeds. The patient is to avoid Metamucil or fiber supplementation. The patient is to also consider a trial of a probiotic such as Align once a day.\par Internal Hemorrhoids: The patient is to consider a trial of Anusol H. C. suppositories one per rectum nightly and Anusol HC2.5% cream apply to affected area twice a day p.r.n. hemorrhoidal bleeding or pain.\par Radiation Proctitis: The patient has a history of radiation proctitis. If the bleeding persists, consider a trial of Canasa suppositories 1000mg QHS for possible radiation proctitis.\par Hypertrophied Anal Papilla versus Anal Polyps: The patient had hypertrophied anal papilla versus anal polyps noted on prior colonoscopy. The patient had a surgical evaluation with Dr. Bryan Briscoe. The patient was told that the pathology revealed evidence of radiation proctitis. The patient agrees and will follow-up.\par History of Colonic Polyps: The patient has a history of colonic polyps.  I recommend a repeat colonoscopy to reassess for colonic polyps.  The patient was told of the risks and benefits of the procedure.  The patient was told of the risks of perforation, emergency surgery, bleeding, infections and missed lesions.  The patient is to be on a clear liquid diet the entire day prior to the procedure. The patient is to complete the entire prescribed bowel prep the day prior to the procedure as directed. The patient is to have a COVID 19 PCR nasopharyngeal swab performed at the approved sites 3 days prior to the procedure.  The patient is told not to drive, drink alcohol, use recreational drugs, exercise, or work the day of the procedure.  The patient was told of the need for an escort to accompany the patient home after the procedure. The patient is aware that the procedure may be cancelled if they fail to follow the directions.  The patient agreed and will schedule for the procedure. The patient can take the antihypertensive medication with a sip of water one hour prior to the procedure. The patient is to hold the diabetic medication the day before and the morning of the procedure. The patient is to hold the blood thinner medication for 7 days prior to the procedure. The patient is to be n.p.o. after midnight and bowel prep was given.  The patient is to return for the procedure. \par Hiatal Hernia: The patient was advised to avoid late-night meals and dietary indiscretions. The patient was advised to avoid fried and fatty foods. The patient was advised to abide by an anti-GERD diet. The patient was given a pamphlet for anti-GERD. The patient and I reviewed the anti-GERD diet at length.\par Gastritis: The patient has a history of gastritis. The patient is to avoid nonsteroidal anti-inflammatory drugs and aspirin. I recommend a trial of Pantoprazole 40 mg once a day x 3 months for the symptoms.\par Duodenitis: The patient is to avoid nonsteroidal anti-inflammatory drugs and aspirin.\par Anal Squamous Cancer: The flexible sigmoidoscopy to the splenic flexure revealed rectal polyp, mild left sided diverticulosis, mild rectal erythema suggestive of mild proctitis and internal hemorrhoids. The rectal polyp was snared and removed. There was no bleeding post polypectomy. 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 The patient is being followed by Dr. 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 The patient was also being followed by radiation oncologist, Dr. Ghassan Hess and Dr. Gelacio Bunch, s/p radiation therapy. The patient completed the course of chemotherapy and radiation therapy for close to 1 year with no issues. The patient was also evaluated by Dr. Bryan Briscoe for the squamous cell carcinoma. The patient agrees to followup s/p treatment. I recommend continue follow up with gynecologist, oncologist and radiation oncologist.\par Fatty Liver: The patient had an imaging study suggestive of fatty infiltration of the liver. The patient denies any jaundice or pruritus. The patient denies any alcohol use. The patient denies taking large doses of nonsteroidal anti-inflammatory drugs or acetaminophen. The findings are suggestive of fatty liver. The patient and I had a long discussion regarding the risks of fatty liver and possible progression to cirrhosis. The patient was told of the possible increased risk of developing liver failure, cirrhosis, ascites, GI bleeding secondary to varices, hepatic encephalopathy, bleeding tendencies and liver cancer. The patient was told of the importance of follow-up. The patient was advised to follow up every 6 months for blood work and imaging studies. The patient agreed and will follow up. The patient was advised to lose weight. I recommend a trial of vitamin E supplementation for the fatty liver. If the liver enzymes remain elevated, the patient may require a trial of Pioglitazone for the fatty liver. I recommend avoid alcohol and hepato-toxic agents. The patient was also advised to avoid NSAIDs, Acetaminophen and any other hepatotoxic drugs. The patient was also advised not to share needles, razors, scissors, nail clippers, etc.. The patient is to continue close follow-up in our office for blood work and exams. If the liver enzymes remain elevated, the patient may require a CT guided liver biopsy to assess the liver parenchyma for possible treatment. We had a long discussion regarding the risks and benefits of the procedure. The patient was told of the risks of bleeding, perforation, infections, emergency surgery and missing lesions. The patient agreed and will follow-up to reassess the symptoms. \par History of Pancreatic Cystic Lesion: The patient was found to have a pancreatic cystic lesion on prior CAT scan of the abdomen and pelvis with IV contrast, abdominal ultrasound and MRCP with and without IV contrast. The patient denies any jaundice, pruritus or back pain. The patient also denies any abdominal pain. The patient denies any prior history of EtOH abuse. The abdominal ultrasound performed on February 24, 2021 revealed a 0.7 cm hypoechoic lesion in the pancreatic head. The MRI of the pancreas and MRCP with and without IV contrast performed on March 31, 2021 revealed a few small nonspecific cystic lesions in the pancreas with the largest measuring 0.7 cm in the pancreatic neck. The blood work performed on February 25, 2021 revealed an elevated blood glucose of 117 mg/dl, an elevated LDH of 219 U/L, and elevated CEA of 6.9 ng/mL and a normal CA 19-9 of 0.9 U/ml. I recommend a repeat MRI of the pancreas in one year to reassess the pancreatic cystic lesions. The patient and I had a long discussion regarding the potential risks of pancreatic cysts progressing to pancreatic cancer. If the MRI reveals a change in the pancreatic cystic lesions, the patient may require an endoscopic ultrasound of pancreas with possible fine-needle aspiration to better assess the pancreatic cystic lesions. The patient is aware of the importance for followup. The patient agrees and will followup.\par I recommend continue on a trial of Calmoseptine cream apply to affected area twice a day for the anal discomfort. \par Follow-up: The patient is to follow-up in the office in 4 months to reassess the symptoms. The patient was told to call the office if any further problems.\par

## 2022-01-21 ENCOUNTER — NON-APPOINTMENT (OUTPATIENT)
Age: 72
End: 2022-01-21

## 2022-01-21 ENCOUNTER — OUTPATIENT (OUTPATIENT)
Dept: OUTPATIENT SERVICES | Facility: HOSPITAL | Age: 72
LOS: 1 days | End: 2022-01-21
Payer: COMMERCIAL

## 2022-01-21 ENCOUNTER — APPOINTMENT (OUTPATIENT)
Dept: ULTRASOUND IMAGING | Facility: HOSPITAL | Age: 72
End: 2022-01-21
Payer: COMMERCIAL

## 2022-01-21 DIAGNOSIS — K76.0 FATTY (CHANGE OF) LIVER, NOT ELSEWHERE CLASSIFIED: ICD-10-CM

## 2022-01-21 PROCEDURE — 76700 US EXAM ABDOM COMPLETE: CPT

## 2022-01-21 PROCEDURE — 76700 US EXAM ABDOM COMPLETE: CPT | Mod: 26

## 2022-05-24 ENCOUNTER — NON-APPOINTMENT (OUTPATIENT)
Age: 72
End: 2022-05-24

## 2022-05-24 LAB — SARS-COV-2 N GENE NPH QL NAA+PROBE: DETECTED

## 2022-05-26 ENCOUNTER — APPOINTMENT (OUTPATIENT)
Dept: GASTROENTEROLOGY | Facility: HOSPITAL | Age: 72
End: 2022-05-26

## 2022-06-06 ENCOUNTER — APPOINTMENT (OUTPATIENT)
Dept: GASTROENTEROLOGY | Facility: CLINIC | Age: 72
End: 2022-06-06

## 2022-09-05 LAB — SARS-COV-2 N GENE NPH QL NAA+PROBE: NOT DETECTED

## 2022-09-06 ENCOUNTER — OUTPATIENT (OUTPATIENT)
Dept: OUTPATIENT SERVICES | Facility: HOSPITAL | Age: 72
LOS: 1 days | End: 2022-09-06
Payer: MEDICARE

## 2022-09-06 ENCOUNTER — TRANSCRIPTION ENCOUNTER (OUTPATIENT)
Age: 72
End: 2022-09-06

## 2022-09-06 ENCOUNTER — APPOINTMENT (OUTPATIENT)
Dept: GASTROENTEROLOGY | Facility: HOSPITAL | Age: 72
End: 2022-09-06

## 2022-09-06 VITALS
HEIGHT: 60 IN | DIASTOLIC BLOOD PRESSURE: 56 MMHG | HEART RATE: 80 BPM | WEIGHT: 117.07 LBS | TEMPERATURE: 98 F | OXYGEN SATURATION: 100 % | RESPIRATION RATE: 19 BRPM | SYSTOLIC BLOOD PRESSURE: 112 MMHG

## 2022-09-06 VITALS
RESPIRATION RATE: 20 BRPM | HEART RATE: 74 BPM | SYSTOLIC BLOOD PRESSURE: 109 MMHG | DIASTOLIC BLOOD PRESSURE: 55 MMHG | OXYGEN SATURATION: 99 %

## 2022-09-06 DIAGNOSIS — Z86.010 PERSONAL HISTORY OF COLONIC POLYPS: ICD-10-CM

## 2022-09-06 PROCEDURE — 45378 DIAGNOSTIC COLONOSCOPY: CPT

## 2022-09-06 PROCEDURE — G0105: CPT

## 2022-09-06 RX ORDER — SODIUM CHLORIDE 9 MG/ML
1000 INJECTION, SOLUTION INTRAVENOUS
Refills: 0 | Status: DISCONTINUED | OUTPATIENT
Start: 2022-09-06 | End: 2022-09-20

## 2022-09-06 RX ORDER — SODIUM CHLORIDE 9 MG/ML
500 INJECTION INTRAMUSCULAR; INTRAVENOUS; SUBCUTANEOUS
Refills: 0 | Status: DISCONTINUED | OUTPATIENT
Start: 2022-09-06 | End: 2022-09-20

## 2022-09-06 NOTE — ASU DISCHARGE PLAN (ADULT/PEDIATRIC) - NS MD DC FALL RISK RISK
For information on Fall & Injury Prevention, visit: [email or web address] OR  [email or web address] OR  https://www.cdc.gov/steadi/patient.html

## 2022-09-06 NOTE — ASU PATIENT PROFILE, ADULT - FALL HARM RISK - UNIVERSAL INTERVENTIONS
Bed in lowest position, wheels locked, appropriate side rails in place/Call bell, personal items and telephone in reach/Instruct patient to call for assistance before getting out of bed or chair/Non-slip footwear when patient is out of bed/[place] to call system/Physically safe environment - no spills, clutter or unnecessary equipment/Purposeful Proactive Rounding/Room/bathroom lighting operational, light cord in reach

## 2022-09-06 NOTE — ASU PATIENT PROFILE, ADULT - NS TRANSFER DENTURES
Pt c/o chest congestion x 2 days. Pt denies chest pain or sob. Had CXR, showed no significant findings,. Afebrile. Pt making an appointment to see PCP tomorrow. n/a

## 2022-09-23 ENCOUNTER — APPOINTMENT (OUTPATIENT)
Dept: GASTROENTEROLOGY | Facility: CLINIC | Age: 72
End: 2022-09-23

## 2022-09-23 VITALS
TEMPERATURE: 98.1 F | WEIGHT: 117 LBS | DIASTOLIC BLOOD PRESSURE: 74 MMHG | OXYGEN SATURATION: 96 % | HEIGHT: 60 IN | BODY MASS INDEX: 22.97 KG/M2 | SYSTOLIC BLOOD PRESSURE: 129 MMHG | HEART RATE: 69 BPM

## 2022-09-23 DIAGNOSIS — R19.8 OTHER SPECIFIED SYMPTOMS AND SIGNS INVOLVING THE DIGESTIVE SYSTEM AND ABDOMEN: ICD-10-CM

## 2022-09-23 DIAGNOSIS — K57.90 DIVERTICULOSIS OF INTESTINE, PART UNSPECIFIED, W/OUT PERFORATION OR ABSCESS W/OUT BLEEDING: ICD-10-CM

## 2022-09-23 PROCEDURE — 99214 OFFICE O/P EST MOD 30 MIN: CPT

## 2022-09-23 NOTE — HISTORY OF PRESENT ILLNESS
[de-identified] :  The CAT scan of the abdomen and pelvis with IV contrast performed on February 17, 2020 revealed limited CT assessment of heterogeneous density in the head and uncinate process of the pancreas especially for underlying small cystic lesions in the background of fatty infiltration. [FreeTextEntry1] : The MRI of the pancreas and MRCP with and without IV contrast performed on March 31, 2021 revealed a few small nonspecific cystic lesions in the pancreas with the largest measuring 0.7 cm in the pancreatic neck. [de-identified] : The abdominal ultrasound performed on January 21, 2022 revealed a normal abdominal ultrasound.

## 2022-09-23 NOTE — ASSESSMENT
[FreeTextEntry1] : Dyspepsia: The patient complains of dyspeptic symptoms.  The patient was advised to continue to abide by an anti-gas (low FOD-MAP) diet.  The patient was previously given a pamphlet for anti-gas (low FOD-MAP).  The patient and I reviewed the anti-gas (low FOD-MAP)diet at length again. The patient is to continue on a trial of Simethicone one tablet 4 times a day p.r.n. abdominal pain and gas.\par GERD: The patient was advised to avoid late-night meals and dietary indiscretions.  The patient was advised to avoid fried and fatty foods.  The patient was advised to abide by an anti-GERD diet. The patient was given a pamphlet for anti-GERD.  The patient and I reviewed the anti-GERD diet at length. I recommend a trial of famotidine 40 mg twice a day x 3 months for the symptoms.\par Constipation: The patient complains of constipation. I recommend a high-fiber diet. I recommend a trial of a probiotic such as Align once a day. I recommend a trial of Metamucil once a day for fiber supplementation.  The patient agreed and will followup to reassess the symptoms.  \par Abnormal Imaging Study: The abdominal ultrasound performed on February 24, 2021 revealed a 0.7 cm hypoechoic lesion in the pancreatic head. The MRI of the pancreas and MRCP with and without IV contrast performed on March 31, 2021 revealed a few small nonspecific cystic lesions in the pancreas with the largest measuring 0.7 cm in the pancreatic neck. The abdominal ultrasound performed on February 24, 2021 revealed a 0.7 cm hypoechoic lesion in the pancreatic head. The MRI of the pancreas and MRCP with and without IV contrast performed on March 31, 2021 revealed a few small nonspecific cystic lesions in the pancreas with the largest measuring 0.7 cm in the pancreatic neck. The blood work performed on February 25, 2021 revealed an elevated blood glucose of 117 mg/dl, an elevated LDH of 219 U/L, and elevated CEA of 6.9 ng/mL and a normal CA 19-9 of 0.9 U/ml.\par Diverticulosis: I recommend a low fiber diet and avoid seeds. The patient is to avoid Metamucil or fiber supplementation. The patient is to also consider a trial of a probiotic such as Align once a day.\par Internal Hemorrhoids: The patient is to consider a trial of Anusol H. C. suppositories one per rectum nightly and Anusol HC2.5% cream apply to affected area twice a day p.r.n. hemorrhoidal bleeding or pain.\par Radiation Proctitis: The patient has a history of radiation proctitis. If the bleeding persists, consider a trial of Canasa suppositories 1000mg QHS for possible radiation proctitis.\par Hypertrophied Anal Papilla versus Anal Polyps: The patient had hypertrophied anal papilla versus anal polyps noted on prior colonoscopy. The patient had a surgical evaluation with Dr. Bryan Briscoe. The patient was told that the pathology revealed evidence of radiation proctitis. The patient agrees and will follow-up.\par History of Colonic Polyps: The patient has a history of colonic polyps. I recommend a repeat colonoscopy in 5 years to reassess for colonic polyps unless symptomatic. The patient agreed and will schedule for the procedure. \par Hiatal Hernia: The patient was advised to avoid late-night meals and dietary indiscretions. The patient was advised to avoid fried and fatty foods. The patient was advised to abide by an anti-GERD diet. The patient was given a pamphlet for anti-GERD. The patient and I reviewed the anti-GERD diet at length.\par Gastritis: The patient has a history of gastritis. The patient is to avoid nonsteroidal anti-inflammatory drugs and aspirin.  I recommend a trial of famotidine 40 mg twice a day x 3 months for the symptoms.\par Duodenitis: The patient is to avoid nonsteroidal anti-inflammatory drugs and aspirin.\par Anal Squamous Cancer: The flexible sigmoidoscopy to the splenic flexure revealed rectal polyp, mild left sided diverticulosis, mild rectal erythema suggestive of mild proctitis and internal hemorrhoids. The rectal polyp was snared and removed. There was no bleeding post polypectomy. 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 The patient is being followed by Dr. 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 The patient was also being followed by radiation oncologist, Dr. Ghassan Hess and Dr. Gelacio Bunch, s/p radiation therapy. The patient completed the course of chemotherapy and radiation therapy for close to 1 year with no issues. The patient was also evaluated by Dr. Bryan Briscoe for the squamous cell carcinoma. The patient agrees to followup s/p treatment. I recommend continue follow up with gynecologist, oncologist and radiation oncologist.\par Fatty Liver: The patient had an imaging study suggestive of fatty infiltration of the liver. The patient denies any jaundice or pruritus. The patient denies any alcohol use. The patient denies taking large doses of nonsteroidal anti-inflammatory drugs or acetaminophen. The findings are suggestive of fatty liver. The patient and I had a long discussion regarding the risks of fatty liver and possible progression to cirrhosis. The patient was told of the possible increased risk of developing liver failure, cirrhosis, ascites, GI bleeding secondary to varices, hepatic encephalopathy, bleeding tendencies and liver cancer. The patient was told of the importance of follow-up. The patient was advised to follow up every 6 months for blood work and imaging studies. The patient agreed and will follow up. The patient was advised to lose weight. I recommend a trial of vitamin E supplementation for the fatty liver. If the liver enzymes remain elevated, the patient may require a trial of Pioglitazone for the fatty liver. I recommend avoid alcohol and hepato-toxic agents. The patient was also advised to avoid NSAIDs, Acetaminophen and any other hepatotoxic drugs. The patient was also advised not to share needles, razors, scissors, nail clippers, etc.. The patient is to continue close follow-up in our office for blood work and exams. If the liver enzymes remain elevated, the patient may require a CT guided liver biopsy to assess the liver parenchyma for possible treatment. We had a long discussion regarding the risks and benefits of the procedure. The patient was told of the risks of bleeding, perforation, infections, emergency surgery and missing lesions. The patient agreed and will follow-up to reassess the symptoms. \par History of Pancreatic Cystic Lesion: The patient was found to have a pancreatic cystic lesion on prior CAT scan of the abdomen and pelvis with IV contrast, abdominal ultrasound and MRCP with and without IV contrast. The patient denies any jaundice, pruritus or back pain. The patient also denies any abdominal pain. The patient denies any prior history of EtOH abuse. The abdominal ultrasound performed on February 24, 2021 revealed a 0.7 cm hypoechoic lesion in the pancreatic head. The MRI of the pancreas and MRCP with and without IV contrast performed on March 31, 2021 revealed a few small nonspecific cystic lesions in the pancreas with the largest measuring 0.7 cm in the pancreatic neck. The blood work performed on February 25, 2021 revealed an elevated blood glucose of 117 mg/dl, an elevated LDH of 219 U/L, and elevated CEA of 6.9 ng/mL and a normal CA 19-9 of 0.9 U/ml. I recommend a repeat MRI of the pancreas in one year to reassess the pancreatic cystic lesions. The patient and I had a long discussion regarding the potential risks of pancreatic cysts progressing to pancreatic cancer. If the MRI reveals a change in the pancreatic cystic lesions, the patient may require an endoscopic ultrasound of pancreas with possible fine-needle aspiration to better assess the pancreatic cystic lesions. The patient is aware of the importance for followup. The patient agrees and will followup.\par I recommend continue on a trial of Calmoseptine cream apply to affected area twice a day for the anal discomfort. \par Follow-up: The patient is to follow-up in the office in 3 months to reassess the symptoms. The patient was told to call the office if any further problems.\par

## 2022-09-26 ENCOUNTER — NON-APPOINTMENT (OUTPATIENT)
Age: 72
End: 2022-09-26

## 2022-09-26 LAB
AFP-TM SERPL-MCNC: 2 NG/ML
ALBUMIN SERPL ELPH-MCNC: 4.7 G/DL
ALP BLD-CCNC: 87 U/L
ALT SERPL-CCNC: 13 U/L
AMYLASE/CREAT SERPL: 66 U/L
ANION GAP SERPL CALC-SCNC: 12 MMOL/L
AST SERPL-CCNC: 20 U/L
BASOPHILS # BLD AUTO: 0.06 K/UL
BASOPHILS NFR BLD AUTO: 0.8 %
BILIRUB SERPL-MCNC: 0.4 MG/DL
BUN SERPL-MCNC: 21 MG/DL
CALCIUM SERPL-MCNC: 10.4 MG/DL
CANCER AG19-9 SERPL-ACNC: <2 U/ML
CEA SERPL-MCNC: 7.2 NG/ML
CHLORIDE SERPL-SCNC: 102 MMOL/L
CO2 SERPL-SCNC: 26 MMOL/L
CREAT SERPL-MCNC: 0.63 MG/DL
EGFR: 93 ML/MIN/1.73M2
EOSINOPHIL # BLD AUTO: 0.21 K/UL
EOSINOPHIL NFR BLD AUTO: 2.7 %
ERYTHROCYTE [SEDIMENTATION RATE] IN BLOOD BY WESTERGREN METHOD: 25 MM/HR
GLUCOSE SERPL-MCNC: 91 MG/DL
HCT VFR BLD CALC: 42.6 %
HGB BLD-MCNC: 13.8 G/DL
IMM GRANULOCYTES NFR BLD AUTO: 0.3 %
LPL SERPL-CCNC: 24 U/L
LYMPHOCYTES # BLD AUTO: 1.27 K/UL
LYMPHOCYTES NFR BLD AUTO: 16.6 %
MAN DIFF?: NORMAL
MCHC RBC-ENTMCNC: 31.1 PG
MCHC RBC-ENTMCNC: 32.4 GM/DL
MCV RBC AUTO: 95.9 FL
MONOCYTES # BLD AUTO: 0.75 K/UL
MONOCYTES NFR BLD AUTO: 9.8 %
NEUTROPHILS # BLD AUTO: 5.34 K/UL
NEUTROPHILS NFR BLD AUTO: 69.8 %
PLATELET # BLD AUTO: 296 K/UL
POTASSIUM SERPL-SCNC: 5.1 MMOL/L
PROT SERPL-MCNC: 7 G/DL
RBC # BLD: 4.44 M/UL
RBC # FLD: 13.1 %
SODIUM SERPL-SCNC: 141 MMOL/L
WBC # FLD AUTO: 7.65 K/UL

## 2022-09-30 ENCOUNTER — NON-APPOINTMENT (OUTPATIENT)
Age: 72
End: 2022-09-30

## 2022-12-23 ENCOUNTER — RX RENEWAL (OUTPATIENT)
Age: 72
End: 2022-12-23

## 2022-12-23 RX ORDER — FAMOTIDINE 40 MG/1
40 TABLET, FILM COATED ORAL TWICE DAILY
Qty: 180 | Refills: 3 | Status: ACTIVE | COMMUNITY
Start: 2021-01-22 | End: 1900-01-01

## 2023-03-01 ENCOUNTER — APPOINTMENT (OUTPATIENT)
Dept: GASTROENTEROLOGY | Facility: CLINIC | Age: 73
End: 2023-03-01
Payer: MEDICARE

## 2023-03-01 VITALS
OXYGEN SATURATION: 98 % | SYSTOLIC BLOOD PRESSURE: 131 MMHG | TEMPERATURE: 98.2 F | DIASTOLIC BLOOD PRESSURE: 71 MMHG | BODY MASS INDEX: 23.56 KG/M2 | HEIGHT: 60 IN | WEIGHT: 120 LBS | HEART RATE: 74 BPM

## 2023-03-01 DIAGNOSIS — R93.89 ABNORMAL FINDINGS ON DIAGNOSTIC IMAGING OF OTHER SPECIFIED BODY STRUCTURES: ICD-10-CM

## 2023-03-01 PROCEDURE — 99214 OFFICE O/P EST MOD 30 MIN: CPT

## 2023-03-01 RX ORDER — POLYETHYLENE GLYCOL 3350 17 G/17G
17 POWDER, FOR SOLUTION ORAL DAILY
Qty: 30 | Refills: 3 | Status: ACTIVE | COMMUNITY
Start: 2023-03-01 | End: 1900-01-01

## 2023-03-01 RX ORDER — HYDROCORTISONE 2.5% 25 MG/G
2.5 CREAM TOPICAL
Qty: 2 | Refills: 3 | Status: ACTIVE | COMMUNITY
Start: 2023-03-01 | End: 1900-01-01

## 2023-03-01 RX ORDER — FAMOTIDINE 40 MG/1
40 TABLET, FILM COATED ORAL
Qty: 60 | Refills: 3 | Status: ACTIVE | COMMUNITY
Start: 2023-03-01 | End: 1900-01-01

## 2023-03-01 NOTE — ASSESSMENT
[FreeTextEntry1] : Dyspepsia: The patient complains of dyspeptic symptoms.  The patient was advised to continue to abide by an anti-gas (low FOD-MAP) diet.  The patient was previously given a pamphlet for anti-gas (low FOD-MAP).  The patient and I reviewed the anti-gas (low FOD-MAP)diet at length again. The patient is to continue on a trial of Simethicone one tablet 4 times a day p.r.n. abdominal pain and gas.\par GERD: The patient was advised to avoid late-night meals and dietary indiscretions.  The patient was advised to avoid fried and fatty foods.  The patient was advised to abide by an anti-GERD diet. The patient was given a pamphlet for anti-GERD.  The patient and I reviewed the anti-GERD diet at length. I recommend a trial of famotidine 40 mg twice a day x 3 months for the symptoms.\par Atypical Chest Pain: The patient complains of atypical chest pain of unclear etiology.  The patient was advised to follow up with the PMD and cardiologist regarding evaluation for the atypical chest pain. The patient was told of possible etiologies such as cardiac, pulmonary, GI, musculoskeletal, stress and other causes for the atypical chest pain.  The patient agrees and will follow-up with the PMD and cardiologist. \par Constipation: The patient complains of constipation. I recommend a high-fiber diet. I recommend a trial of a probiotic such as Align once a day. I recommend a trial of Metamucil once a day for fiber supplementation. I recommend a trial of Miralax 1 packet once a day for the constipation.  The patient agreed and will followup to reassess the symptoms.  \par Abnormal Imaging Study: The abdominal ultrasound performed on February 24, 2021 revealed a 0.7 cm hypoechoic lesion in the pancreatic head. The MRI of the pancreas and MRCP with and without IV contrast performed on March 31, 2021 revealed a few small nonspecific cystic lesions in the pancreas with the largest measuring 0.7 cm in the pancreatic neck. The abdominal ultrasound performed on February 24, 2021 revealed a 0.7 cm hypoechoic lesion in the pancreatic head. The MRI of the pancreas and MRCP with and without IV contrast performed on March 31, 2021 revealed a few small nonspecific cystic lesions in the pancreas with the largest measuring 0.7 cm in the pancreatic neck. The blood work performed on February 25, 2021 revealed an elevated blood glucose of 117 mg/dl, an elevated LDH of 219 U/L, and elevated CEA of 6.9 ng/mL and a normal CA 19-9 of 0.9 U/ml.\par Diverticulosis: I recommend a low fiber diet and avoid seeds. The patient is to avoid Metamucil or fiber supplementation. The patient is to also consider a trial of a probiotic such as Align once a day.\par Internal Hemorrhoids: The patient is to consider a trial of Anusol H. C. suppositories one per rectum nightly and Anusol HC2.5% cream apply to affected area twice a day p.r.n. hemorrhoidal bleeding or pain.\par Radiation Proctitis: The patient has a history of radiation proctitis. If the bleeding persists, consider a trial of Canasa suppositories 1000mg QHS for possible radiation proctitis.\par Hypertrophied Anal Papilla versus Anal Polyps: The patient had hypertrophied anal papilla versus anal polyps noted on prior colonoscopy. The patient had a surgical evaluation with Dr. Bryan Briscoe. The patient was told that the pathology revealed evidence of radiation proctitis. The patient agrees and will follow-up.\par History of Colonic Polyps: The patient has a history of colonic polyps. I recommend a repeat colonoscopy in 5 years to reassess for colonic polyps unless symptomatic. The patient agreed and will schedule for the procedure. \par Hiatal Hernia: The patient was advised to avoid late-night meals and dietary indiscretions. The patient was advised to avoid fried and fatty foods. The patient was advised to abide by an anti-GERD diet. The patient was given a pamphlet for anti-GERD. The patient and I reviewed the anti-GERD diet at length.\par Gastritis: The patient has a history of gastritis. The patient is to avoid nonsteroidal anti-inflammatory drugs and aspirin. I recommend a trial of famotidine 40 mg twice a day x 3 months for the symptoms.\par Duodenitis: The patient is to avoid nonsteroidal anti-inflammatory drugs and aspirin.\par Anal Squamous Cancer: The flexible sigmoidoscopy to the splenic flexure revealed rectal polyp, mild left sided diverticulosis, mild rectal erythema suggestive of mild proctitis and internal hemorrhoids. The rectal polyp was snared and removed. There was no bleeding post polypectomy. 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 The patient is being followed by Dr. 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 The patient was also being followed by radiation oncologist, Dr. Ghassan Hess and Dr. Gelacio Bunch, s/p radiation therapy. The patient completed the course of chemotherapy and radiation therapy for close to 1 year with no issues. The patient was also evaluated by Dr. Bryan Briscoe for the squamous cell carcinoma. The patient agrees to followup s/p treatment. I recommend continue follow up with gynecologist, oncologist and radiation oncologist.\par Fatty Liver: The patient had an imaging study suggestive of fatty infiltration of the liver. The patient denies any jaundice or pruritus. The patient denies any alcohol use. The patient denies taking large doses of nonsteroidal anti-inflammatory drugs or acetaminophen. The findings are suggestive of fatty liver. The patient and I had a long discussion regarding the risks of fatty liver and possible progression to cirrhosis. The patient was told of the possible increased risk of developing liver failure, cirrhosis, ascites, GI bleeding secondary to varices, hepatic encephalopathy, bleeding tendencies and liver cancer. The patient was told of the importance of follow-up. The patient was advised to follow up every 6 months for blood work and imaging studies. The patient agreed and will follow up. The patient was advised to lose weight. I recommend a trial of vitamin E supplementation for the fatty liver. If the liver enzymes remain elevated, the patient may require a trial of Pioglitazone for the fatty liver. I recommend avoid alcohol and hepato-toxic agents. The patient was also advised to avoid NSAIDs, Acetaminophen and any other hepatotoxic drugs. The patient was also advised not to share needles, razors, scissors, nail clippers, etc.. The patient is to continue close follow-up in our office for blood work and exams. If the liver enzymes remain elevated, the patient may require a CT guided liver biopsy to assess the liver parenchyma for possible treatment. We had a long discussion regarding the risks and benefits of the procedure. The patient was told of the risks of bleeding, perforation, infections, emergency surgery and missing lesions. The patient agreed and will follow-up to reassess the symptoms. \par History of Pancreatic Cystic Lesion: The patient was found to have a pancreatic cystic lesion on prior CAT scan of the abdomen and pelvis with IV contrast, abdominal ultrasound and MRCP with and without IV contrast. The patient denies any jaundice, pruritus or back pain. The patient also denies any abdominal pain. The patient denies any prior history of EtOH abuse. The abdominal ultrasound performed on February 24, 2021 revealed a 0.7 cm hypoechoic lesion in the pancreatic head. The MRI of the pancreas and MRCP with and without IV contrast performed on March 31, 2021 revealed a few small nonspecific cystic lesions in the pancreas with the largest measuring 0.7 cm in the pancreatic neck. The blood work performed on February 25, 2021 revealed an elevated blood glucose of 117 mg/dl, an elevated LDH of 219 U/L, and elevated CEA of 6.9 ng/mL and a normal CA 19-9 of 0.9 U/ml. I recommend a repeat MRI of the pancreas in one year to reassess the pancreatic cystic lesions. The patient and I had a long discussion regarding the potential risks of pancreatic cysts progressing to pancreatic cancer. If the MRI reveals a change in the pancreatic cystic lesions, the patient may require an endoscopic ultrasound of pancreas with possible fine-needle aspiration to better assess the pancreatic cystic lesions. The patient is aware of the importance for followup. The patient agrees and will followup.\par Blood Work: I recommend blood work to assess the patient's symptoms. I recommend a CBC, SMA 24, amylase, lipase, ESR, TFTs, LUIS, rheumatoid factor, celiac sprue panel, IgA, profile for hepatitis A, B, C. ,iron, TIBC, ferritin level, CEA and lipid profile.  I also recommend obtaining the recent blood work performed by the patient's PMD.\par I recommend continue on a trial of Calmoseptine cream apply to affected area twice a day for the anal discomfort. \par Follow-up: The patient is to follow-up in the office in 3 months to reassess the symptoms. The patient was told to call the office if any further problems.\par  \par \par

## 2023-03-01 NOTE — HISTORY OF PRESENT ILLNESS
[de-identified] :  The CAT scan of the abdomen and pelvis with IV contrast performed on February 17, 2020 revealed limited CT assessment of heterogeneous density in the head and uncinate process of the pancreas especially for underlying small cystic lesions in the background of fatty infiltration. [FreeTextEntry1] :  The patient had an MRI of the abdomen and pelvis with IV contrast and MRCP performed on September 27, 2022 to assess the symptoms. The MRI and MRCP performed on September 27, 2022 revealed multiple tiny to small pancreatic cysts. Also noted were small hepatic cysts with no suspicious liver lesions.   \par \par The MRI of the pancreas and MRCP with and without IV contrast performed on March 31, 2021 revealed a few small nonspecific cystic lesions in the pancreas with the largest measuring 0.7 cm in the pancreatic neck. [de-identified] : The abdominal ultrasound performed on January 21, 2022 revealed a normal abdominal ultrasound.

## 2023-03-02 ENCOUNTER — NON-APPOINTMENT (OUTPATIENT)
Age: 73
End: 2023-03-02

## 2023-03-02 DIAGNOSIS — Z01.818 ENCOUNTER FOR OTHER PREPROCEDURAL EXAMINATION: ICD-10-CM

## 2023-03-03 LAB
ALBUMIN SERPL ELPH-MCNC: 4.2 G/DL
ALP BLD-CCNC: 82 U/L
ALT SERPL-CCNC: 19 U/L
AMYLASE/CREAT SERPL: 69 U/L
ANION GAP SERPL CALC-SCNC: 13 MMOL/L
AST SERPL-CCNC: 28 U/L
BASOPHILS # BLD AUTO: 0.04 K/UL
BASOPHILS NFR BLD AUTO: 0.5 %
BILIRUB SERPL-MCNC: 0.3 MG/DL
BUN SERPL-MCNC: 20 MG/DL
CALCIUM SERPL-MCNC: 9.3 MG/DL
CANCER AG19-9 SERPL-ACNC: <2 U/ML
CEA SERPL-MCNC: 7.6 NG/ML
CHLORIDE SERPL-SCNC: 105 MMOL/L
CHOLEST SERPL-MCNC: 163 MG/DL
CO2 SERPL-SCNC: 26 MMOL/L
CREAT SERPL-MCNC: 0.61 MG/DL
EGFR: 94 ML/MIN/1.73M2
EOSINOPHIL # BLD AUTO: 0.35 K/UL
EOSINOPHIL NFR BLD AUTO: 4.8 %
ERYTHROCYTE [SEDIMENTATION RATE] IN BLOOD BY WESTERGREN METHOD: 6 MM/HR
FERRITIN SERPL-MCNC: 67 NG/ML
GGT SERPL-CCNC: 11 U/L
GLIADIN IGA SER QL: <5 UNITS
GLIADIN IGG SER QL: <5 UNITS
GLIADIN PEPTIDE IGA SER-ACNC: NEGATIVE
GLIADIN PEPTIDE IGG SER-ACNC: NEGATIVE
GLUCOSE SERPL-MCNC: 93 MG/DL
HCT VFR BLD CALC: 41.7 %
HDLC SERPL-MCNC: 75 MG/DL
HGB BLD-MCNC: 13.2 G/DL
IGA SER QL IEP: 147 MG/DL
IMM GRANULOCYTES NFR BLD AUTO: 0.3 %
IRON SATN MFR SERPL: 24 %
IRON SERPL-MCNC: 72 UG/DL
LDLC SERPL CALC-MCNC: 77 MG/DL
LPL SERPL-CCNC: 26 U/L
LYMPHOCYTES # BLD AUTO: 1.03 K/UL
LYMPHOCYTES NFR BLD AUTO: 14.1 %
MAN DIFF?: NORMAL
MCHC RBC-ENTMCNC: 31 PG
MCHC RBC-ENTMCNC: 31.7 GM/DL
MCV RBC AUTO: 97.9 FL
MONOCYTES # BLD AUTO: 0.67 K/UL
MONOCYTES NFR BLD AUTO: 9.2 %
NEUTROPHILS # BLD AUTO: 5.2 K/UL
NEUTROPHILS NFR BLD AUTO: 71.1 %
NONHDLC SERPL-MCNC: 88 MG/DL
PLATELET # BLD AUTO: 272 K/UL
POTASSIUM SERPL-SCNC: 4.5 MMOL/L
PROT SERPL-MCNC: 6.6 G/DL
RBC # BLD: 4.26 M/UL
RBC # FLD: 13.3 %
RHEUMATOID FACT SER QL: <10 IU/ML
SODIUM SERPL-SCNC: 144 MMOL/L
TIBC SERPL-MCNC: 297 UG/DL
TRIGL SERPL-MCNC: 57 MG/DL
TSH SERPL-ACNC: 0.85 UIU/ML
TTG IGA SER IA-ACNC: <1.2 U/ML
TTG IGA SER-ACNC: NEGATIVE
TTG IGG SER IA-ACNC: <1.2 U/ML
TTG IGG SER IA-ACNC: NEGATIVE
UIBC SERPL-MCNC: 225 UG/DL
WBC # FLD AUTO: 7.31 K/UL

## 2023-03-05 ENCOUNTER — NON-APPOINTMENT (OUTPATIENT)
Age: 73
End: 2023-03-05

## 2023-03-05 LAB — ANA SER IF-ACNC: NEGATIVE

## 2023-03-07 LAB — SARS-COV-2 N GENE NPH QL NAA+PROBE: NOT DETECTED

## 2023-03-09 ENCOUNTER — RESULT REVIEW (OUTPATIENT)
Age: 73
End: 2023-03-09

## 2023-03-09 ENCOUNTER — OUTPATIENT (OUTPATIENT)
Dept: OUTPATIENT SERVICES | Facility: HOSPITAL | Age: 73
LOS: 1 days | End: 2023-03-09
Payer: MEDICARE

## 2023-03-09 ENCOUNTER — APPOINTMENT (OUTPATIENT)
Dept: GASTROENTEROLOGY | Facility: HOSPITAL | Age: 73
End: 2023-03-09

## 2023-03-09 ENCOUNTER — TRANSCRIPTION ENCOUNTER (OUTPATIENT)
Age: 73
End: 2023-03-09

## 2023-03-09 VITALS
WEIGHT: 117.95 LBS | TEMPERATURE: 98 F | SYSTOLIC BLOOD PRESSURE: 124 MMHG | DIASTOLIC BLOOD PRESSURE: 62 MMHG | RESPIRATION RATE: 16 BRPM | OXYGEN SATURATION: 97 % | HEIGHT: 60 IN | HEART RATE: 86 BPM

## 2023-03-09 VITALS
OXYGEN SATURATION: 97 % | DIASTOLIC BLOOD PRESSURE: 62 MMHG | HEART RATE: 69 BPM | SYSTOLIC BLOOD PRESSURE: 107 MMHG | RESPIRATION RATE: 14 BRPM

## 2023-03-09 DIAGNOSIS — Z98.891 HISTORY OF UTERINE SCAR FROM PREVIOUS SURGERY: Chronic | ICD-10-CM

## 2023-03-09 DIAGNOSIS — Z98.890 OTHER SPECIFIED POSTPROCEDURAL STATES: Chronic | ICD-10-CM

## 2023-03-09 DIAGNOSIS — R97.0 ELEVATED CARCINOEMBRYONIC ANTIGEN [CEA]: ICD-10-CM

## 2023-03-09 PROCEDURE — 88312 SPECIAL STAINS GROUP 1: CPT

## 2023-03-09 PROCEDURE — 43239 EGD BIOPSY SINGLE/MULTIPLE: CPT

## 2023-03-09 PROCEDURE — 88305 TISSUE EXAM BY PATHOLOGIST: CPT

## 2023-03-09 PROCEDURE — 88305 TISSUE EXAM BY PATHOLOGIST: CPT | Mod: 26

## 2023-03-09 PROCEDURE — 88312 SPECIAL STAINS GROUP 1: CPT | Mod: 26

## 2023-03-09 RX ORDER — SODIUM CHLORIDE 9 MG/ML
500 INJECTION INTRAMUSCULAR; INTRAVENOUS; SUBCUTANEOUS
Refills: 0 | Status: COMPLETED | OUTPATIENT
Start: 2023-03-09 | End: 2023-03-09

## 2023-03-09 RX ADMIN — SODIUM CHLORIDE 30 MILLILITER(S): 9 INJECTION INTRAMUSCULAR; INTRAVENOUS; SUBCUTANEOUS at 10:01

## 2023-03-09 NOTE — ASU PATIENT PROFILE, ADULT - FALL HARM RISK - UNIVERSAL INTERVENTIONS
Bed in lowest position, wheels locked, appropriate side rails in place/Call bell, personal items and telephone in reach/Instruct patient to call for assistance before getting out of bed or chair/Non-slip footwear when patient is out of bed/[place] to call system/Physically safe environment - no spills, clutter or unnecessary equipment/Purposeful Proactive Rounding/Room/bathroom lighting operational, light cord in reach

## 2023-03-09 NOTE — ASU DISCHARGE PLAN (ADULT/PEDIATRIC) - NS MD DC FALL RISK RISK
For information on Fall & Injury Prevention, visit: [email or web address] OR  [email or web address] OR  https://www.cdc.gov/steadi/patient.html

## 2023-03-10 ENCOUNTER — NON-APPOINTMENT (OUTPATIENT)
Age: 73
End: 2023-03-10

## 2023-03-13 LAB — SURGICAL PATHOLOGY STUDY: SIGNIFICANT CHANGE UP

## 2023-03-18 ENCOUNTER — NON-APPOINTMENT (OUTPATIENT)
Age: 73
End: 2023-03-18

## 2023-03-22 ENCOUNTER — APPOINTMENT (OUTPATIENT)
Dept: GASTROENTEROLOGY | Facility: CLINIC | Age: 73
End: 2023-03-22
Payer: MEDICARE

## 2023-03-22 VITALS
WEIGHT: 121 LBS | HEIGHT: 60 IN | HEART RATE: 87 BPM | TEMPERATURE: 97.3 F | SYSTOLIC BLOOD PRESSURE: 130 MMHG | OXYGEN SATURATION: 98 % | DIASTOLIC BLOOD PRESSURE: 73 MMHG | BODY MASS INDEX: 23.75 KG/M2

## 2023-03-22 DIAGNOSIS — K29.30 CHRONIC SUPERFICIAL GASTRITIS W/OUT BLEEDING: ICD-10-CM

## 2023-03-22 DIAGNOSIS — K21.9 GASTRO-ESOPHAGEAL REFLUX DISEASE W/OUT ESOPHAGITIS: ICD-10-CM

## 2023-03-22 DIAGNOSIS — K21.00 GASTRO-ESOPHAGEAL REFLUX DISEASE WITH ESOPHAGITIS, WITHOUT BLEEDING: ICD-10-CM

## 2023-03-22 PROBLEM — E78.5 HYPERLIPIDEMIA, UNSPECIFIED: Chronic | Status: ACTIVE | Noted: 2023-03-09

## 2023-03-22 PROBLEM — J45.909 UNSPECIFIED ASTHMA, UNCOMPLICATED: Chronic | Status: ACTIVE | Noted: 2023-03-09

## 2023-03-22 PROBLEM — I10 ESSENTIAL (PRIMARY) HYPERTENSION: Chronic | Status: ACTIVE | Noted: 2023-03-09

## 2023-03-22 PROCEDURE — 99213 OFFICE O/P EST LOW 20 MIN: CPT

## 2023-03-22 RX ORDER — PANTOPRAZOLE 40 MG/1
40 TABLET, DELAYED RELEASE ORAL
Qty: 90 | Refills: 2 | Status: ACTIVE | COMMUNITY
Start: 2023-03-22 | End: 1900-01-01

## 2023-03-22 NOTE — ASSESSMENT
[FreeTextEntry1] : Dyspepsia: The patient complains of dyspeptic symptoms.  The patient was advised to continue to abide by an anti-gas (low FOD-MAP) diet.  The patient was previously given a pamphlet for anti-gas (low FOD-MAP).  The patient and I reviewed the anti-gas (low FOD-MAP)diet at length again. The patient is to continue on a trial of Simethicone one tablet 4 times a day p.r.n. abdominal pain and gas.\par GERD: The patient was advised to avoid late-night meals and dietary indiscretions.  The patient was advised to avoid fried and fatty foods.  The patient was advised to abide by an anti-GERD diet. The patient was given a pamphlet for anti-GERD.  The patient and I reviewed the anti-GERD diet at length. I recommend a trial of Pantoprazole 40 mg once a day x 3 months for the symptoms.\par Reflux Esophagitis:  The patient had reflux esophagitis noted on prior upper endoscopy.  The patient was advised to avoid late-night meals and dietary indiscretions.  The patient was advised to avoid fried and fatty foods.  The patient was advised to abide by an anti-GERD diet. The patient was given a pamphlet for anti-GERD.  The patient and I reviewed the anti-GERD diet at length.  I recommend a trial of pantoprazole 40 mg once a day x 3 months for the reflux esophagitis.\par Hiatal Hernia:  The patient has a history of a hiatal hernia noted on prior upper endoscopy. The patient was advised to avoid late-night meals and dietary indiscretions.  The patient was advised to avoid fried and fatty foods.  The patient was advised to abide by an anti-GERD diet. The patient was given a pamphlet for anti-GERD.  The patient and I reviewed the anti-GERD diet at length. I recommend a trial of Pantoprazole 40 mg once a day for 3 months for the symptoms.\par Gastritis: The patient has a history of gastritis noted on prior upper endoscopy. The patient is to avoid nonsteroidal anti-inflammatory drugs and aspirin. I recommend a trial of pantoprazole 40 mg once a day for 3 months for the symptoms. \par Abnormal Imaging Study: The abdominal ultrasound performed on February 24, 2021 revealed a 0.7 cm hypoechoic lesion in the pancreatic head. The MRI of the pancreas and MRCP with and without IV contrast performed on March 31, 2021 revealed a few small nonspecific cystic lesions in the pancreas with the largest measuring 0.7 cm in the pancreatic neck. The abdominal ultrasound performed on February 24, 2021 revealed a 0.7 cm hypoechoic lesion in the pancreatic head. The MRI of the pancreas and MRCP with and without IV contrast performed on March 31, 2021 revealed a few small nonspecific cystic lesions in the pancreas with the largest measuring 0.7 cm in the pancreatic neck. The blood work performed on February 25, 2021 revealed an elevated blood glucose of 117 mg/dl, an elevated LDH of 219 U/L, and elevated CEA of 6.9 ng/mL and a normal CA 19-9 of 0.9 U/ml.\par Diverticulosis: I recommend a low fiber diet and avoid seeds. The patient is to avoid Metamucil or fiber supplementation. The patient is to also consider a trial of a probiotic such as Align once a day.\par Internal Hemorrhoids: The patient is to consider a trial of Anusol H. C. suppositories one per rectum nightly and Anusol HC2.5% cream apply to affected area twice a day p.r.n. hemorrhoidal bleeding or pain.\par Radiation Proctitis: The patient has a history of radiation proctitis. If the bleeding recurs, consider a trial of Canasa suppositories 1000mg QHS for possible radiation proctitis.\par Hypertrophied Anal Papilla versus Anal Polyps: The patient had hypertrophied anal papilla versus anal polyps noted on prior colonoscopy. The patient had a surgical evaluation with Dr. Bryan Briscoe. The patient was told that the pathology revealed evidence of radiation proctitis. The patient agrees and will follow-up.\par History of Colonic Polyps: The patient has a history of colonic polyps. I recommend a repeat colonoscopy in 4 years to reassess for colonic polyps unless symptomatic. The patient agreed and will schedule for the procedure. \par Anal Squamous Cancer: The flexible sigmoidoscopy to the splenic flexure revealed rectal polyp, mild left sided diverticulosis, mild rectal erythema suggestive of mild proctitis and internal hemorrhoids. The rectal polyp was snared and removed. There was no bleeding post polypectomy. 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 The patient is being followed by Dr. 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 The patient was also being followed by radiation oncologist, Dr. Ghassan Hess and Dr. Gelacio Bunch, s/p radiation therapy. The patient completed the course of chemotherapy and radiation therapy for close to 1 year with no issues. The patient was also evaluated by Dr. Bryan Briscoe for the squamous cell carcinoma. The patient agrees to followup s/p treatment. I recommend continue follow up with gynecologist, oncologist and radiation oncologist.\par Fatty Liver: The patient had an imaging study suggestive of fatty infiltration of the liver. The patient denies any jaundice or pruritus. The patient denies any alcohol use. The patient denies taking large doses of nonsteroidal anti-inflammatory drugs or acetaminophen. The findings are suggestive of fatty liver. The patient and I had a long discussion regarding the risks of fatty liver and possible progression to cirrhosis. The patient was told of the possible increased risk of developing liver failure, cirrhosis, ascites, GI bleeding secondary to varices, hepatic encephalopathy, bleeding tendencies and liver cancer. The patient was told of the importance of follow-up. The patient was advised to follow up every 6 months for blood work and imaging studies. The patient agreed and will follow up. The patient was advised to lose weight. I recommend a trial of vitamin E supplementation for the fatty liver. If the liver enzymes remain elevated, the patient may require a trial of Pioglitazone for the fatty liver. I recommend avoid alcohol and hepato-toxic agents. The patient was also advised to avoid NSAIDs, Acetaminophen and any other hepatotoxic drugs. The patient was also advised not to share needles, razors, scissors, nail clippers, etc.. The patient is to continue close follow-up in our office for blood work and exams. If the liver enzymes remain elevated, the patient may require a CT guided liver biopsy to assess the liver parenchyma for possible treatment. We had a long discussion regarding the risks and benefits of the procedure. The patient was told of the risks of bleeding, perforation, infections, emergency surgery and missing lesions. The patient agreed and will follow-up to reassess the symptoms. \par History of Pancreatic Cystic Lesion: The patient was found to have a pancreatic cystic lesion on prior CAT scan of the abdomen and pelvis with IV contrast, abdominal ultrasound and MRCP with and without IV contrast. The patient denies any jaundice, pruritus or back pain. The patient also denies any abdominal pain. The patient denies any prior history of EtOH abuse. The abdominal ultrasound performed on February 24, 2021 revealed a 0.7 cm hypoechoic lesion in the pancreatic head. The MRI of the pancreas and MRCP with and without IV contrast performed on March 31, 2021 revealed a few small nonspecific cystic lesions in the pancreas with the largest measuring 0.7 cm in the pancreatic neck. The blood work performed on February 25, 2021 revealed an elevated blood glucose of 117 mg/dl, an elevated LDH of 219 U/L, and elevated CEA of 6.9 ng/mL and a normal CA 19-9 of 0.9 U/ml. I recommend a repeat MRI of the pancreas in one year to reassess the pancreatic cystic lesions. The patient and I had a long discussion regarding the potential risks of pancreatic cysts progressing to pancreatic cancer. If the MRI reveals a change in the pancreatic cystic lesions, the patient may require an endoscopic ultrasound of pancreas with possible fine-needle aspiration to better assess the pancreatic cystic lesions. The patient is aware of the importance for followup. The patient agrees and will followup.\par Blood Work: I reviewed the recent blood work performed by the patient's PMD.\par I recommend continue on a trial of Calmoseptine cream apply to affected area twice a day for the anal discomfort. \par Follow-up: The patient is to follow-up in the office in 3 months to reassess the symptoms. The patient was told to call the office if any further problems.\par

## 2023-03-22 NOTE — HISTORY OF PRESENT ILLNESS
[de-identified] : The CAT scan of the abdomen and pelvis with IV contrast performed on March 8, 2023 revealed no suspicious mass or collection and scoliosis and disc degenerative changes of the lumbar spine.  \par \par  The CAT scan of the abdomen and pelvis with IV contrast performed on February 17, 2020 revealed limited CT assessment of heterogeneous density in the head and uncinate process of the pancreas especially for underlying small cystic lesions in the background of fatty infiltration. [FreeTextEntry1] :  The patient had an MRI of the abdomen and pelvis with IV contrast and MRCP performed on September 27, 2022 to assess the symptoms. The MRI and MRCP performed on September 27, 2022 revealed multiple tiny to small pancreatic cysts. Also noted were small hepatic cysts with no suspicious liver lesions.   \par \par The MRI of the pancreas and MRCP with and without IV contrast performed on March 31, 2021 revealed a few small nonspecific cystic lesions in the pancreas with the largest measuring 0.7 cm in the pancreatic neck. [de-identified] : The abdominal ultrasound performed on January 21, 2022 revealed a normal abdominal ultrasound.

## 2023-07-28 ENCOUNTER — APPOINTMENT (OUTPATIENT)
Dept: GASTROENTEROLOGY | Facility: CLINIC | Age: 73
End: 2023-07-28
Payer: MEDICARE

## 2023-07-28 VITALS
SYSTOLIC BLOOD PRESSURE: 116 MMHG | OXYGEN SATURATION: 99 % | HEIGHT: 60 IN | HEART RATE: 80 BPM | BODY MASS INDEX: 23.56 KG/M2 | WEIGHT: 120 LBS | TEMPERATURE: 97.2 F | DIASTOLIC BLOOD PRESSURE: 62 MMHG

## 2023-07-28 DIAGNOSIS — R97.0 ELEVATED CARCINOEMBRYONIC ANTIGEN [CEA]: ICD-10-CM

## 2023-07-28 DIAGNOSIS — K62.7 RADIATION PROCTITIS: ICD-10-CM

## 2023-07-28 DIAGNOSIS — K76.0 FATTY (CHANGE OF) LIVER, NOT ELSEWHERE CLASSIFIED: ICD-10-CM

## 2023-07-28 DIAGNOSIS — K62.5 HEMORRHAGE OF ANUS AND RECTUM: ICD-10-CM

## 2023-07-28 PROCEDURE — 99213 OFFICE O/P EST LOW 20 MIN: CPT

## 2023-07-28 RX ORDER — SIMETHICONE 125 MG/1
125 TABLET, CHEWABLE ORAL
Qty: 120 | Refills: 3 | Status: ACTIVE | COMMUNITY
Start: 2023-07-28 | End: 1900-01-01

## 2023-07-28 RX ORDER — HYDROCORTISONE 25 MG/G
2.5 CREAM TOPICAL 3 TIMES DAILY
Qty: 2 | Refills: 3 | Status: ACTIVE | COMMUNITY
Start: 2023-07-28 | End: 1900-01-01

## 2023-07-28 NOTE — ASSESSMENT
[FreeTextEntry1] : Dyspepsia: The patient complains of dyspeptic symptoms.  The patient was advised to continue to abide by an anti-gas (low FOD-MAP) diet.  The patient was previously given a pamphlet for anti-gas (low FOD-MAP).  The patient and I reviewed the anti-gas (low FOD-MAP)diet at length again. The patient is to continue on a trial of Simethicone one tablet 4 times a day p.r.n. abdominal pain and gas.\par Constipation: The patient complains of constipation. I recommend a high-fiber diet. I recommend a trial of a probiotic such as Align once a day. I recommend a trial of Metamucil once a day for fiber supplementation.  The patient agreed and will followup to reassess the symptoms.  \par Rectal Bleeding: The patient had episodes of rectal bleeding.  The etiology of the rectal bleeding is unclear.  I recommend a trial of Anusol HC suppositories one per rectum QHS and Anusol HC 2.5% cream apply to affected area twice a day PRN hemorrhoidal bleeding or pain.  I recommend a trial of Calmoseptine cream apply to affected area twice a day for rectal discomfort. I recommend Tucks pads for the hemorrhoids.  I recommend starting Sitz baths twice a day for the hemorrhoids.  I recommend avoid wearing tight underwear and use boxers. I recommend avoid touching the perineal area. \par Reflux Esophagitis: The patient had reflux esophagitis noted on prior upper endoscopy. The patient was advised to avoid late-night meals and dietary indiscretions. The patient was advised to avoid fried and fatty foods. The patient was advised to abide by an anti-GERD diet. The patient was given a pamphlet for anti-GERD. The patient and I reviewed the anti-GERD diet at length. I recommend a trial of Famotidine 40 mg twice a day PRN for the reflux esophagitis.\par Hiatal Hernia: The patient has a history of a hiatal hernia noted on prior upper endoscopy. The patient was advised to avoid late-night meals and dietary indiscretions. The patient was advised to avoid fried and fatty foods. The patient was advised to abide by an anti-GERD diet. The patient was given a pamphlet for anti-GERD. The patient and I reviewed the anti-GERD diet at length.  I recommend a trial of Famotidine 40 mg twice a day PRN for the reflux esophagitis.\par Gastritis: The patient has a history of gastritis noted on prior upper endoscopy. The patient is to avoid nonsteroidal anti-inflammatory drugs and aspirin.  I recommend a trial of Famotidine 40 mg twice a day PRN for the reflux esophagitis.\par Abnormal Imaging Study: The abdominal ultrasound performed on February 24, 2021 revealed a 0.7 cm hypoechoic lesion in the pancreatic head. The MRI of the pancreas and MRCP with and without IV contrast performed on March 31, 2021 revealed a few small nonspecific cystic lesions in the pancreas with the largest measuring 0.7 cm in the pancreatic neck. The abdominal ultrasound performed on February 24, 2021 revealed a 0.7 cm hypoechoic lesion in the pancreatic head. The MRI of the pancreas and MRCP with and without IV contrast performed on March 31, 2021 revealed a few small nonspecific cystic lesions in the pancreas with the largest measuring 0.7 cm in the pancreatic neck. The blood work performed on February 25, 2021 revealed an elevated blood glucose of 117 mg/dl, an elevated LDH of 219 U/L, and elevated CEA of 6.9 ng/mL and a normal CA 19-9 of 0.9 U/ml.\par Diverticulosis: I recommend a low fiber diet and avoid seeds. The patient is to avoid Metamucil or fiber supplementation. The patient is to also consider a trial of a probiotic such as Align once a day.\par Internal Hemorrhoids: The patient is to consider a trial of Anusol H. C. suppositories one per rectum nightly and Anusol HC2.5% cream apply to affected area twice a day p.r.n. hemorrhoidal bleeding or pain.\par Radiation Proctitis: The patient has a history of radiation proctitis. If the bleeding recurs, consider a trial of Canasa suppositories 1000 mg QHS for possible radiation proctitis.\par Hypertrophied Anal Papilla versus Anal Polyps: The patient had hypertrophied anal papilla versus anal polyps noted on prior colonoscopy. The patient had a surgical evaluation with Dr. Bryan Briscoe. The patient was told that the pathology revealed evidence of radiation proctitis. The patient agrees and will follow-up.\par History of Colonic Polyps: The patient has a history of colonic polyps. I recommend a repeat colonoscopy in 4 years to reassess for colonic polyps unless symptomatic. The patient agreed and will schedule for the procedure. \par Anal Squamous Cancer: The flexible sigmoidoscopy to the splenic flexure revealed rectal polyp, mild left sided diverticulosis, mild rectal erythema suggestive of mild proctitis and internal hemorrhoids. The rectal polyp was snared and removed. There was no bleeding post polypectomy. 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 The patient is being followed by Dr. 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 The patient was also being followed by radiation oncologist, Dr. Ghassan Hess and Dr. Gelacio Bunch, s/p radiation therapy. The patient completed the course of chemotherapy and radiation therapy for close to 1 year with no issues. The patient was also evaluated by Dr. Bryan Briscoe for the squamous cell carcinoma. The patient agrees to followup s/p treatment. I recommend continue follow up with gynecologist, oncologist and radiation oncologist.\par Fatty Liver: The patient had an imaging study suggestive of fatty infiltration of the liver. The patient denies any jaundice or pruritus. The patient denies any alcohol use. The patient denies taking large doses of nonsteroidal anti-inflammatory drugs or acetaminophen. The findings are suggestive of fatty liver. The patient and I had a long discussion regarding the risks of fatty liver and possible progression to cirrhosis. The patient was told of the possible increased risk of developing liver failure, cirrhosis, ascites, GI bleeding secondary to varices, hepatic encephalopathy, bleeding tendencies and liver cancer. The patient was told of the importance of follow-up. The patient was advised to follow up every 6 months for blood work and imaging studies. The patient agreed and will follow up. The patient was advised to lose weight. I recommend a trial of vitamin E supplementation for the fatty liver. If the liver enzymes remain elevated, the patient may require a trial of Pioglitazone for the fatty liver. I recommend avoid alcohol and hepato-toxic agents. The patient was also advised to avoid NSAIDs, Acetaminophen and any other hepatotoxic drugs. The patient was also advised not to share needles, razors, scissors, nail clippers, etc.. The patient is to continue close follow-up in our office for blood work and exams. If the liver enzymes remain elevated, the patient may require a CT guided liver biopsy to assess the liver parenchyma for possible treatment. We had a long discussion regarding the risks and benefits of the procedure. The patient was told of the risks of bleeding, perforation, infections, emergency surgery and missing lesions. The patient agreed and will follow-up to reassess the symptoms. \par History of Pancreatic Cystic Lesion: The patient was previously found to have a pancreatic cystic lesion on prior CAT scan of the abdomen and pelvis with IV contrast, abdominal ultrasound and MRCP with and without IV contrast. The patient denies any jaundice, pruritus or back pain. The patient denies any abdominal pain. The patient denies any prior history of EtOH abuse. The abdominal ultrasound performed on February 24, 2021 revealed a 0.7 cm hypoechoic lesion in the pancreatic head. The MRI of the pancreas and MRCP with and without IV contrast performed on March 31, 2021 revealed a few small nonspecific cystic lesions in the pancreas with the largest measuring 0.7 cm in the pancreatic neck. The blood work performed on February 25, 2021 revealed an elevated blood glucose of 117 mg/dl, an elevated LDH of 219 U/L, and elevated CEA of 6.9 ng/mL and a normal CA 19-9 of 0.9 U/ml. I recommended a repeat MRI of the pancreas to reassess the pancreatic cystic lesions. The patient and I had a long discussion regarding the potential risks of pancreatic cysts progressing to pancreatic cancer. If the MRI reveals a change in the pancreatic cystic lesions, the patient may require an endoscopic ultrasound of pancreas with possible fine-needle aspiration to better assess the pancreatic cystic lesions. The patient is aware of the importance for followup. The patient is refusing any further studies. She is aware of the risk of pancreatic cancer but still refuses any further testing.\par Blood Work: I reviewed the prior  blood work and imaging study performed by the patient's PMD.\par I recommend continue on a trial of Calmoseptine cream apply to affected area twice a day for the anal discomfort. \par Follow-up: The patient is to follow-up in the office in 3 months to reassess the symptoms. The patient was told to call the office if any further problems.\par \par \par

## 2023-07-28 NOTE — HISTORY OF PRESENT ILLNESS
[de-identified] : The CAT scan of the abdomen and pelvis with IV contrast performed on March 8, 2023 revealed no suspicious mass or collection and scoliosis and disc degenerative changes of the lumbar spine.  \par \par  The CAT scan of the abdomen and pelvis with IV contrast performed on February 17, 2020 revealed limited CT assessment of heterogeneous density in the head and uncinate process of the pancreas especially for underlying small cystic lesions in the background of fatty infiltration. [FreeTextEntry1] :  The patient had an MRI of the abdomen and pelvis with IV contrast and MRCP performed on September 27, 2022 to assess the symptoms. The MRI and MRCP performed on September 27, 2022 revealed multiple tiny to small pancreatic cysts. Also noted were small hepatic cysts with no suspicious liver lesions.   \par \par The MRI of the pancreas and MRCP with and without IV contrast performed on March 31, 2021 revealed a few small nonspecific cystic lesions in the pancreas with the largest measuring 0.7 cm in the pancreatic neck. [de-identified] : The abdominal ultrasound performed on January 21, 2022 revealed a normal abdominal ultrasound.

## 2023-09-06 ENCOUNTER — APPOINTMENT (OUTPATIENT)
Dept: GASTROENTEROLOGY | Facility: CLINIC | Age: 73
End: 2023-09-06

## 2024-03-08 ENCOUNTER — APPOINTMENT (OUTPATIENT)
Dept: GASTROENTEROLOGY | Facility: CLINIC | Age: 74
End: 2024-03-08
Payer: MEDICARE

## 2024-03-08 ENCOUNTER — RX RENEWAL (OUTPATIENT)
Age: 74
End: 2024-03-08

## 2024-03-08 VITALS
SYSTOLIC BLOOD PRESSURE: 123 MMHG | HEIGHT: 60 IN | BODY MASS INDEX: 23.36 KG/M2 | TEMPERATURE: 97.9 F | DIASTOLIC BLOOD PRESSURE: 73 MMHG | WEIGHT: 119 LBS | OXYGEN SATURATION: 96 % | HEART RATE: 76 BPM

## 2024-03-08 DIAGNOSIS — R10.32 RIGHT LOWER QUADRANT PAIN: ICD-10-CM

## 2024-03-08 DIAGNOSIS — K30 FUNCTIONAL DYSPEPSIA: ICD-10-CM

## 2024-03-08 DIAGNOSIS — K44.9 DIAPHRAGMATIC HERNIA W/OUT OBSTRUCTION OR GANGRENE: ICD-10-CM

## 2024-03-08 DIAGNOSIS — K64.8 OTHER HEMORRHOIDS: ICD-10-CM

## 2024-03-08 DIAGNOSIS — R10.31 RIGHT LOWER QUADRANT PAIN: ICD-10-CM

## 2024-03-08 DIAGNOSIS — Z86.010 PERSONAL HISTORY OF COLONIC POLYPS: ICD-10-CM

## 2024-03-08 DIAGNOSIS — K59.00 CONSTIPATION, UNSPECIFIED: ICD-10-CM

## 2024-03-08 DIAGNOSIS — K86.2 CYST OF PANCREAS: ICD-10-CM

## 2024-03-08 DIAGNOSIS — Z85.048 PERSONAL HISTORY OF OTHER MALIGNANT NEOPLASM OF RECTUM, RECTOSIGMOID JUNCTION, AND ANUS: ICD-10-CM

## 2024-03-08 PROCEDURE — 99214 OFFICE O/P EST MOD 30 MIN: CPT

## 2024-03-08 RX ORDER — HYDROCORTISONE 25 MG/G
2.5 CREAM TOPICAL 3 TIMES DAILY
Qty: 2 | Refills: 3 | Status: ACTIVE | COMMUNITY
Start: 2024-03-08 | End: 1900-01-01

## 2024-03-08 RX ORDER — FAMOTIDINE 40 MG/1
40 TABLET, FILM COATED ORAL
Qty: 180 | Refills: 0 | Status: ACTIVE | COMMUNITY
Start: 2024-03-08 | End: 1900-01-01

## 2024-03-08 RX ORDER — SIMETHICONE 125 MG/1
125 TABLET, CHEWABLE ORAL
Qty: 120 | Refills: 3 | Status: ACTIVE | COMMUNITY
Start: 2024-03-08 | End: 1900-01-01

## 2024-03-08 NOTE — ASSESSMENT
[FreeTextEntry1] : Abdominal Pain: The patient complains of abdominal pain. The patient is to avoid nonsteroidal anti-inflammatory drugs and aspirin.  I recommend a trial of Phazyme 1 tablet PO 3 times a day for 3 months for the symptoms. Dyspepsia: The patient complains of dyspeptic symptoms.  The patient was advised to continue to abide by an anti-gas (low FOD-MAP) diet.  The patient was previously given a pamphlet for anti-gas (low FOD-MAP).  The patient and I reviewed the anti-gas (low FOD-MAP)diet at length again. The patient is to continue on a trial of Simethicone one tablet 4 times a day p.r.n. abdominal pain and gas. GERD: The patient was advised to avoid late-night meals and dietary indiscretions.  The patient was advised to avoid fried and fatty foods.  The patient was advised to abide by an anti-GERD diet. The patient was given a pamphlet for anti-GERD.  The patient and I reviewed the anti-GERD diet at length. I recommend a trial of famotidine 40 mg twice a day x 3 months for the symptoms. Constipation: The patient complains of constipation. I recommend a high-fiber diet. I recommend a trial of a probiotic such as Align once a day. I recommend a trial of Metamucil once a day for fiber supplementation. The patient agreed and will followup to reassess the symptoms.   Reflux Esophagitis: The patient had reflux esophagitis noted on prior upper endoscopy. The patient was advised to avoid late-night meals and dietary indiscretions. The patient was advised to avoid fried and fatty foods. The patient was advised to abide by an anti-GERD diet. The patient was given a pamphlet for anti-GERD. The patient and I reviewed the anti-GERD diet at length. I recommend a trial of Famotidine 40 mg twice a day PRN for the reflux esophagitis. Hiatal Hernia: The patient has a history of a hiatal hernia noted on prior upper endoscopy. The patient was advised to avoid late-night meals and dietary indiscretions. The patient was advised to avoid fried and fatty foods. The patient was advised to abide by an anti-GERD diet. The patient was given a pamphlet for anti-GERD. The patient and I reviewed the anti-GERD diet at length. I recommend a trial of Famotidine 40 mg twice a day PRN for the reflux esophagitis. Gastritis: The patient has a history of gastritis noted on prior upper endoscopy. The patient is to avoid nonsteroidal anti-inflammatory drugs and aspirin. I recommend a trial of Famotidine 40 mg twice a day PRN for the reflux esophagitis. Abnormal Imaging Study: The abdominal ultrasound performed on February 24, 2021 revealed a 0.7 cm hypoechoic lesion in the pancreatic head. The MRI of the pancreas and MRCP with and without IV contrast performed on March 31, 2021 revealed a few small nonspecific cystic lesions in the pancreas with the largest measuring 0.7 cm in the pancreatic neck. The abdominal ultrasound performed on February 24, 2021 revealed a 0.7 cm hypoechoic lesion in the pancreatic head. The MRI of the pancreas and MRCP with and without IV contrast performed on March 31, 2021 revealed a few small nonspecific cystic lesions in the pancreas with the largest measuring 0.7 cm in the pancreatic neck. The blood work performed on February 25, 2021 revealed an elevated blood glucose of 117 mg/dl, an elevated LDH of 219 U/L, and elevated CEA of 6.9 ng/mL and a normal CA 19-9 of 0.9 U/ml. Diverticulosis: I recommend a low fiber diet and avoid seeds. The patient is to avoid Metamucil or fiber supplementation. The patient is to also consider a trial of a probiotic such as Align once a day. Internal Hemorrhoids: The patient is to consider a trial of Anusol H. C. suppositories one per rectum nightly and Anusol HC2.5% cream apply to affected area twice a day p.r.n. hemorrhoidal bleeding or pain. Radiation Proctitis: The patient has a history of radiation proctitis. If the bleeding recurs, consider a trial of Canasa suppositories 1000 mg QHS for possible radiation proctitis. Hypertrophied Anal Papilla versus Anal Polyps: The patient had hypertrophied anal papilla versus anal polyps noted on prior colonoscopy. The patient had a surgical evaluation with Dr. Bryan Briscoe. The patient was told that the pathology revealed evidence of radiation proctitis. The patient agrees and will follow-up. History of Colonic Polyps: The patient has a history of colonic polyps. I recommend a repeat colonoscopy in 4 years to reassess for colonic polyps unless symptomatic. The patient agreed and will schedule for the procedure. Anal Squamous Cancer: The flexible sigmoidoscopy to the splenic flexure revealed rectal polyp, mild left sided diverticulosis, mild rectal erythema suggestive of mild proctitis and internal hemorrhoids. The rectal polyp was snared and removed. There was no bleeding post polypectomy. 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 The patient is being followed by Dr. 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 The patient was also being followed by radiation oncologist, Dr. Ghassan Hess and Dr. Gelacio Bunch, s/p radiation therapy. The patient completed the course of chemotherapy and radiation therapy for close to 1 year with no issues. The patient was also evaluated by Dr. Bryan Briscoe for the squamous cell carcinoma. The patient agrees to followup s/p treatment. I recommend continue follow up with gynecologist, oncologist and radiation oncologist. Fatty Liver: The patient had an imaging study suggestive of fatty infiltration of the liver. The patient denies any jaundice or pruritus. The patient denies any alcohol use. The patient denies taking large doses of nonsteroidal anti-inflammatory drugs or acetaminophen. The findings are suggestive of fatty liver. The patient and I had a long discussion regarding the risks of fatty liver and possible progression to cirrhosis. The patient was told of the possible increased risk of developing liver failure, cirrhosis, ascites, GI bleeding secondary to varices, hepatic encephalopathy, bleeding tendencies and liver cancer. The patient was told of the importance of follow-up. The patient was advised to follow up every 6 months for blood work and imaging studies. The patient agreed and will follow up. The patient was advised to lose weight. I recommend a trial of vitamin E supplementation for the fatty liver. If the liver enzymes remain elevated, the patient may require a trial of Pioglitazone for the fatty liver. I recommend avoid alcohol and hepato-toxic agents. The patient was also advised to avoid NSAIDs, Acetaminophen and any other hepatotoxic drugs. The patient was also advised not to share needles, razors, scissors, nail clippers, etc.. The patient is to continue close follow-up in our office for blood work and exams. If the liver enzymes remain elevated, the patient may require a CT guided liver biopsy to assess the liver parenchyma for possible treatment. We had a long discussion regarding the risks and benefits of the procedure. The patient was told of the risks of bleeding, perforation, infections, emergency surgery and missing lesions. The patient agreed and will follow-up to reassess the symptoms. History of Pancreatic Cystic Lesion: The patient was previously found to have a pancreatic cystic lesion on prior CAT scan of the abdomen and pelvis with IV contrast, abdominal ultrasound and MRCP with and without IV contrast. The patient denies any jaundice, pruritus or back pain. The patient denies any abdominal pain. The patient denies any prior history of EtOH abuse. The abdominal ultrasound performed on February 24, 2021 revealed a 0.7 cm hypoechoic lesion in the pancreatic head. The MRI of the pancreas and MRCP with and without IV contrast performed on March 31, 2021 revealed a few small nonspecific cystic lesions in the pancreas with the largest measuring 0.7 cm in the pancreatic neck. The blood work performed on February 25, 2021 revealed an elevated blood glucose of 117 mg/dl, an elevated LDH of 219 U/L, and elevated CEA of 6.9 ng/mL and a normal CA 19-9 of 0.9 U/ml. I recommended a repeat MRI of the pancreas to reassess the pancreatic cystic lesions. The patient and I had a long discussion regarding the potential risks of pancreatic cysts progressing to pancreatic cancer. If the MRI reveals a change in the pancreatic cystic lesions, the patient may require an endoscopic ultrasound of pancreas with possible fine-needle aspiration to better assess the pancreatic cystic lesions. The patient is aware of the importance for followup. The patient is refusing any further studies. She is aware of the risk of pancreatic cancer but still refuses any further testing. Blood Work: I reviewed the prior blood work and imaging study performed by the patient's PMD. I recommend continue on a trial of Calmoseptine cream apply to affected area twice a day for the anal discomfort. Follow-up: The patient is to follow-up in the office in 3 months to reassess the symptoms. The patient was told to call the office if any further problems.

## 2024-03-08 NOTE — HISTORY OF PRESENT ILLNESS
[FreeTextEntry1] :  The patient had an MRI of the abdomen and pelvis with IV contrast and MRCP performed on September 27, 2022 to assess the symptoms. The MRI and MRCP performed on September 27, 2022 revealed multiple tiny to small pancreatic cysts. Also noted were small hepatic cysts with no suspicious liver lesions.   \par  \par  The MRI of the pancreas and MRCP with and without IV contrast performed on March 31, 2021 revealed a few small nonspecific cystic lesions in the pancreas with the largest measuring 0.7 cm in the pancreatic neck. [de-identified] : The abdominal ultrasound performed on January 21, 2022 revealed a normal abdominal ultrasound.  [de-identified] : The CAT scan of the abdomen and pelvis with IV contrast performed on March 8, 2023 revealed no suspicious mass or collection and scoliosis and disc degenerative changes of the lumbar spine.  \par  \par   The CAT scan of the abdomen and pelvis with IV contrast performed on February 17, 2020 revealed limited CT assessment of heterogeneous density in the head and uncinate process of the pancreas especially for underlying small cystic lesions in the background of fatty infiltration.

## 2024-09-04 ENCOUNTER — APPOINTMENT (OUTPATIENT)
Dept: GASTROENTEROLOGY | Facility: CLINIC | Age: 74
End: 2024-09-04
Payer: COMMERCIAL

## 2024-09-04 VITALS
BODY MASS INDEX: 24.35 KG/M2 | OXYGEN SATURATION: 95 % | TEMPERATURE: 97.1 F | WEIGHT: 124 LBS | SYSTOLIC BLOOD PRESSURE: 112 MMHG | HEART RATE: 79 BPM | HEIGHT: 60 IN | DIASTOLIC BLOOD PRESSURE: 65 MMHG

## 2024-09-04 DIAGNOSIS — K86.2 CYST OF PANCREAS: ICD-10-CM

## 2024-09-04 DIAGNOSIS — K44.9 DIAPHRAGMATIC HERNIA W/OUT OBSTRUCTION OR GANGRENE: ICD-10-CM

## 2024-09-04 DIAGNOSIS — K76.0 FATTY (CHANGE OF) LIVER, NOT ELSEWHERE CLASSIFIED: ICD-10-CM

## 2024-09-04 DIAGNOSIS — K64.8 OTHER HEMORRHOIDS: ICD-10-CM

## 2024-09-04 DIAGNOSIS — K21.00 GASTRO-ESOPHAGEAL REFLUX DISEASE WITH ESOPHAGITIS, WITHOUT BLEEDING: ICD-10-CM

## 2024-09-04 DIAGNOSIS — Z85.048 PERSONAL HISTORY OF OTHER MALIGNANT NEOPLASM OF RECTUM, RECTOSIGMOID JUNCTION, AND ANUS: ICD-10-CM

## 2024-09-04 DIAGNOSIS — R10.31 RIGHT LOWER QUADRANT PAIN: ICD-10-CM

## 2024-09-04 DIAGNOSIS — R10.32 RIGHT LOWER QUADRANT PAIN: ICD-10-CM

## 2024-09-04 DIAGNOSIS — K59.00 CONSTIPATION, UNSPECIFIED: ICD-10-CM

## 2024-09-04 DIAGNOSIS — Z86.010 PERSONAL HISTORY OF COLONIC POLYPS: ICD-10-CM

## 2024-09-04 PROCEDURE — 99214 OFFICE O/P EST MOD 30 MIN: CPT

## 2024-09-04 RX ORDER — POLYETHYLENE GLYCOL 3350 17 G/17G
17 POWDER, FOR SOLUTION ORAL DAILY
Qty: 510 | Refills: 3 | Status: ACTIVE | COMMUNITY
Start: 2024-09-04 | End: 1900-01-01

## 2024-09-04 NOTE — HISTORY OF PRESENT ILLNESS
[de-identified] : The CAT scan of the abdomen and pelvis with IV contrast performed on March 8, 2023 revealed no suspicious mass or collection and scoliosis and disc degenerative changes of the lumbar spine.  \par  \par   The CAT scan of the abdomen and pelvis with IV contrast performed on February 17, 2020 revealed limited CT assessment of heterogeneous density in the head and uncinate process of the pancreas especially for underlying small cystic lesions in the background of fatty infiltration. [FreeTextEntry1] :  The patient had an MRI of the abdomen and pelvis with IV contrast and MRCP performed on September 27, 2022 to assess the symptoms. The MRI and MRCP performed on September 27, 2022 revealed multiple tiny to small pancreatic cysts. Also noted were small hepatic cysts with no suspicious liver lesions.   \par  \par  The MRI of the pancreas and MRCP with and without IV contrast performed on March 31, 2021 revealed a few small nonspecific cystic lesions in the pancreas with the largest measuring 0.7 cm in the pancreatic neck. [de-identified] : The abdominal ultrasound performed on February 24, 2021 revealed a 0.7 cm hypoechoic lesion in the pancreatic head.  The abdominal ultrasound performed on January 21, 2022 revealed a normal abdominal ultrasound.

## 2024-09-04 NOTE — ASSESSMENT
[FreeTextEntry1] : Abdominal Pain: The patient complains of abdominal pain. The patient is to avoid nonsteroidal anti-inflammatory drugs and aspirin.  I recommend a trial of Phazyme 1 tablet PO 3 times a day for 3 months for the symptoms. Dyspepsia: The patient complains of dyspeptic symptoms.  The patient was advised to continue to abide by an anti-gas (low FOD-MAP) diet.  The patient was previously given a pamphlet for anti-gas (low FOD-MAP).  The patient and I reviewed the anti-gas (low FOD-MAP)diet at length again. The patient is to continue on a trial of Simethicone one tablet 4 times a day p.r.n. abdominal pain and gas. GERD: The patient was advised to avoid late-night meals and dietary indiscretions.  The patient was advised to avoid fried and fatty foods.  The patient was advised to abide by an anti-GERD diet. The patient was given a pamphlet for anti-GERD.  The patient and I reviewed the anti-GERD diet at length. I recommend a trial of famotidine 40 mg twice a day x 3 months for the symptoms. Nausea: The patient complains of nausea. If the symptoms of nausea persists, the patient may require a trial of Zofran 4 mg twice a day.  Constipation: The patient complains of constipation. I recommend a high-fiber diet. I recommend a trial of a probiotic such as Align once a day. I recommend a trial of Metamucil once a day for fiber supplementation. I recommend a trial of Linzess 145 mcg once a day for the constipation. If the symptoms persist, the patient may require a colonoscopy to assess the symptoms.  The patient was told of the risks and benefits of the procedure.  The patient was told of the risks of perforation, emergency surgery, bleeding, infections and missed lesions.  The patient agreed and will followup to reassess the symptoms.   Reflux Esophagitis: The patient had reflux esophagitis noted on prior upper endoscopy. The patient was advised to avoid late-night meals and dietary indiscretions. The patient was advised to avoid fried and fatty foods. The patient was advised to abide by an anti-GERD diet. The patient was given a pamphlet for anti-GERD. The patient and I reviewed the anti-GERD diet at length. I recommend a trial of Famotidine 40 mg twice a day PRN for the reflux esophagitis. Hiatal Hernia: The patient has a history of a hiatal hernia noted on prior upper endoscopy. The patient was advised to avoid late-night meals and dietary indiscretions. The patient was advised to avoid fried and fatty foods. The patient was advised to abide by an anti-GERD diet. The patient was given a pamphlet for anti-GERD. The patient and I reviewed the anti-GERD diet at length. I recommend a trial of Famotidine 40 mg twice a day PRN for the reflux esophagitis. Gastritis: The patient has a history of gastritis noted on prior upper endoscopy. The patient is to avoid nonsteroidal anti-inflammatory drugs and aspirin. I recommend a trial of Famotidine 40 mg twice a day PRN for the reflux esophagitis. Abnormal Imaging Study: The abdominal ultrasound performed on February 24, 2021 revealed a 0.7 cm hypoechoic lesion in the pancreatic head. The MRI of the pancreas and MRCP with and without IV contrast performed on March 31, 2021 revealed a few small nonspecific cystic lesions in the pancreas with the largest measuring 0.7 cm in the pancreatic neck. The abdominal ultrasound performed on February 24, 2021 revealed a 0.7 cm hypoechoic lesion in the pancreatic head. The MRI of the pancreas and MRCP with and without IV contrast performed on March 31, 2021 revealed a few small nonspecific cystic lesions in the pancreas with the largest measuring 0.7 cm in the pancreatic neck. The blood work performed on February 25, 2021 revealed an elevated blood glucose of 117 mg/dl, an elevated LDH of 219 U/L, and elevated CEA of 6.9 ng/mL and a normal CA 19-9 of 0.9 U/ml. Diverticulosis: I recommend a low fiber diet and avoid seeds. The patient is to avoid Metamucil or fiber supplementation. The patient is to also consider a trial of a probiotic such as Align once a day. Internal Hemorrhoids: The patient is to consider a trial of Anusol H. C. suppositories one per rectum nightly and Anusol HC2.5% cream apply to affected area twice a day p.r.n. hemorrhoidal bleeding or pain. Radiation Proctitis: The patient has a history of radiation proctitis. If the bleeding recurs, consider a trial of Canasa suppositories 1000 mg QHS for possible radiation proctitis. Hypertrophied Anal Papilla versus Anal Polyps: The patient had hypertrophied anal papilla versus anal polyps noted on prior colonoscopy. The patient had a surgical evaluation with Dr. Bryan Briscoe. The patient was told that the pathology revealed evidence of radiation proctitis. The patient agrees and will follow-up. History of Colonic Polyps: The patient has a history of colonic polyps. I recommend a repeat colonoscopy in 4 years to reassess for colonic polyps unless symptomatic. The patient agreed and will schedule for the procedure. Anal Squamous Cancer: The flexible sigmoidoscopy to the splenic flexure revealed rectal polyp, mild left sided diverticulosis, mild rectal erythema suggestive of mild proctitis and internal hemorrhoids. The rectal polyp was snared and removed. There was no bleeding post polypectomy. 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 The patient is being followed by Dr. 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 The patient was also being followed by radiation oncologist, Dr. Ghassan Hess and Dr. Gelacio Bunch, s/p radiation therapy. The patient completed the course of chemotherapy and radiation therapy for close to 1 year with no issues. The patient was also evaluated by Dr. Bryan Briscoe for the squamous cell carcinoma. The patient agrees to followup s/p treatment. I recommend continue follow up with gynecologist, oncologist and radiation oncologist. Fatty Liver: The patient had an imaging study suggestive of fatty infiltration of the liver. The patient denies any jaundice or pruritus. The patient denies any alcohol use. The patient denies taking large doses of nonsteroidal anti-inflammatory drugs or acetaminophen. The findings are suggestive of fatty liver. The patient and I had a long discussion regarding the risks of fatty liver and possible progression to cirrhosis. The patient was told of the possible increased risk of developing liver failure, cirrhosis, ascites, GI bleeding secondary to varices, hepatic encephalopathy, bleeding tendencies and liver cancer. The patient was told of the importance of follow-up. The patient was advised to follow up every 6 months for blood work and imaging studies. The patient agreed and will follow up. The patient was advised to lose weight. I recommend a trial of vitamin E supplementation for the fatty liver. If the liver enzymes remain elevated, the patient may require a trial of Pioglitazone for the fatty liver. I recommend avoid alcohol and hepato-toxic agents. The patient was also advised to avoid NSAIDs, Acetaminophen and any other hepatotoxic drugs. The patient was also advised not to share needles, razors, scissors, nail clippers, etc.. The patient is to continue close follow-up in our office for blood work and exams. If the liver enzymes remain elevated, the patient may require a CT guided liver biopsy to assess the liver parenchyma for possible treatment. We had a long discussion regarding the risks and benefits of the procedure. The patient was told of the risks of bleeding, perforation, infections, emergency surgery and missing lesions. The patient agreed and will follow-up to reassess the symptoms. History of Pancreatic Cystic Lesion: The patient was previously found to have a pancreatic cystic lesion on prior CAT scan of the abdomen and pelvis with IV contrast, abdominal ultrasound and MRCP with and without IV contrast. The patient denies any jaundice, pruritus or back pain. The patient denies any abdominal pain. The patient denies any prior history of EtOH abuse. The abdominal ultrasound performed on February 24, 2021 revealed a 0.7 cm hypoechoic lesion in the pancreatic head. The MRI of the pancreas and MRCP with and without IV contrast performed on March 31, 2021 revealed a few small nonspecific cystic lesions in the pancreas with the largest measuring 0.7 cm in the pancreatic neck. The blood work performed on February 25, 2021 revealed an elevated blood glucose of 117 mg/dl, an elevated LDH of 219 U/L, and elevated CEA of 6.9 ng/mL and a normal CA 19-9 of 0.9 U/ml. I recommended a repeat MRI of the pancreas to reassess the pancreatic cystic lesions. The patient and I had a long discussion regarding the potential risks of pancreatic cysts progressing to pancreatic cancer. If the MRI reveals a change in the pancreatic cystic lesions, the patient may require an endoscopic ultrasound of pancreas with possible fine-needle aspiration to better assess the pancreatic cystic lesions. The patient is aware of the importance for followup. The patient is refusing any further studies. She is aware of the risk of pancreatic cancer but still refuses any further testing. Blood Work: I reviewed the prior blood work and imaging study performed by the patient's PMD. I recommend continue on a trial of Calmoseptine cream apply to affected area twice a day for the anal discomfort. Follow-up: The patient is to follow-up in the office in 3 months to reassess the symptoms. The patient was told to call the office if any further problems.

## 2025-03-19 ENCOUNTER — APPOINTMENT (OUTPATIENT)
Dept: GASTROENTEROLOGY | Facility: CLINIC | Age: 75
End: 2025-03-19
Payer: MEDICARE

## 2025-03-19 VITALS
OXYGEN SATURATION: 98 % | HEART RATE: 78 BPM | SYSTOLIC BLOOD PRESSURE: 120 MMHG | DIASTOLIC BLOOD PRESSURE: 68 MMHG | WEIGHT: 122 LBS | BODY MASS INDEX: 23.95 KG/M2 | HEIGHT: 60 IN | TEMPERATURE: 97.9 F

## 2025-03-19 DIAGNOSIS — K64.8 OTHER HEMORRHOIDS: ICD-10-CM

## 2025-03-19 DIAGNOSIS — K21.9 GASTRO-ESOPHAGEAL REFLUX DISEASE W/OUT ESOPHAGITIS: ICD-10-CM

## 2025-03-19 DIAGNOSIS — Z86.0101 PERSONAL HISTORY OF ADENOMATOUS AND SERRATED COLON POLYPS: ICD-10-CM

## 2025-03-19 DIAGNOSIS — K44.9 DIAPHRAGMATIC HERNIA W/OUT OBSTRUCTION OR GANGRENE: ICD-10-CM

## 2025-03-19 DIAGNOSIS — K76.0 FATTY (CHANGE OF) LIVER, NOT ELSEWHERE CLASSIFIED: ICD-10-CM

## 2025-03-19 DIAGNOSIS — R97.0 ELEVATED CARCINOEMBRYONIC ANTIGEN [CEA]: ICD-10-CM

## 2025-03-19 DIAGNOSIS — R93.89 ABNORMAL FINDINGS ON DIAGNOSTIC IMAGING OF OTHER SPECIFIED BODY STRUCTURES: ICD-10-CM

## 2025-03-19 DIAGNOSIS — K86.2 CYST OF PANCREAS: ICD-10-CM

## 2025-03-19 DIAGNOSIS — K30 FUNCTIONAL DYSPEPSIA: ICD-10-CM

## 2025-03-19 DIAGNOSIS — Z85.048 PERSONAL HISTORY OF OTHER MALIGNANT NEOPLASM OF RECTUM, RECTOSIGMOID JUNCTION, AND ANUS: ICD-10-CM

## 2025-03-19 DIAGNOSIS — K62.7 RADIATION PROCTITIS: ICD-10-CM

## 2025-03-19 DIAGNOSIS — R19.8 OTHER SPECIFIED SYMPTOMS AND SIGNS INVOLVING THE DIGESTIVE SYSTEM AND ABDOMEN: ICD-10-CM

## 2025-03-19 PROCEDURE — 99214 OFFICE O/P EST MOD 30 MIN: CPT

## 2025-03-20 ENCOUNTER — NON-APPOINTMENT (OUTPATIENT)
Age: 75
End: 2025-03-20

## 2025-03-21 LAB
AFP-TM SERPL-MCNC: 2.5 NG/ML
ALBUMIN SERPL ELPH-MCNC: 4.6 G/DL
ALP BLD-CCNC: 116 U/L
ALT SERPL-CCNC: 21 U/L
AMYLASE/CREAT SERPL: 79 U/L
ANION GAP SERPL CALC-SCNC: 17 MMOL/L
AST SERPL-CCNC: 42 U/L
BILIRUB SERPL-MCNC: 0.5 MG/DL
BUN SERPL-MCNC: 21 MG/DL
CALCIUM SERPL-MCNC: 9.4 MG/DL
CANCER AG19-9 SERPL-ACNC: <2 U/ML
CEA SERPL-MCNC: 8.9 NG/ML
CHLORIDE SERPL-SCNC: 101 MMOL/L
CHOLEST SERPL-MCNC: 171 MG/DL
CO2 SERPL-SCNC: 23 MMOL/L
CREAT SERPL-MCNC: 0.6 MG/DL
EGFRCR SERPLBLD CKD-EPI 2021: 93 ML/MIN/1.73M2
ERYTHROCYTE [SEDIMENTATION RATE] IN BLOOD BY WESTERGREN METHOD: 32 MM/HR
FERRITIN SERPL-MCNC: 70 NG/ML
GGT SERPL-CCNC: 7 U/L
GLUCOSE SERPL-MCNC: 102 MG/DL
HCT VFR BLD CALC: 45.1 %
HDLC SERPL-MCNC: 75 MG/DL
HGB BLD-MCNC: 14.4 G/DL
IRON SATN MFR SERPL: 18 %
IRON SERPL-MCNC: 62 UG/DL
LDLC SERPL-MCNC: 83 MG/DL
LPL SERPL-CCNC: 26 U/L
MCHC RBC-ENTMCNC: 30.3 PG
MCHC RBC-ENTMCNC: 31.9 G/DL
MCV RBC AUTO: 94.9 FL
NONHDLC SERPL-MCNC: 96 MG/DL
PLATELET # BLD AUTO: 287 K/UL
POTASSIUM SERPL-SCNC: 5 MMOL/L
PROT SERPL-MCNC: 7.5 G/DL
RBC # BLD: 4.75 M/UL
RBC # FLD: 13.9 %
SODIUM SERPL-SCNC: 141 MMOL/L
TIBC SERPL-MCNC: 339 UG/DL
TRIGL SERPL-MCNC: 71 MG/DL
UIBC SERPL-MCNC: 277 UG/DL
WBC # FLD AUTO: 6.96 K/UL

## 2025-03-28 ENCOUNTER — APPOINTMENT (OUTPATIENT)
Dept: MRI IMAGING | Facility: CLINIC | Age: 75
End: 2025-03-28
Payer: MEDICARE

## 2025-03-28 PROCEDURE — 74183 MRI ABD W/O CNTR FLWD CNTR: CPT

## 2025-03-28 PROCEDURE — A9585: CPT | Mod: JW

## 2025-03-31 ENCOUNTER — NON-APPOINTMENT (OUTPATIENT)
Age: 75
End: 2025-03-31

## 2025-09-24 PROBLEM — R11.0 NAUSEA: Status: ACTIVE | Noted: 2025-09-24

## (undated) DEVICE — PACK ENDO PROCEDURE CUSTOM NO VLV

## (undated) DEVICE — TUBING CANNULA SALTER LABS NASAL ADULT 7FT

## (undated) DEVICE — BITE BLOCK ADULT 20 X 27MM (GREEN)

## (undated) DEVICE — CLAMP BX HOT RAD JAW 3

## (undated) DEVICE — ADAPTER ENDO CHNL SINGLE USE

## (undated) DEVICE — TUBING MEDI-VAC W MAXIGRIP CONNECTORS 1/4"X6'

## (undated) DEVICE — VENODYNE/SCD SLEEVE CALF MEDIUM

## (undated) DEVICE — SOL INJ NS 0.9% 500ML 1-PORT

## (undated) DEVICE — FORCEP BIOPSY 2.5MM DISP

## (undated) DEVICE — LUBRICATING JELLY ONESHOT 1.25OZ

## (undated) DEVICE — SOLIDIFIER ISOLYZER 2000 CC

## (undated) DEVICE — CATH ELCTR GLIDE PRB 7FR

## (undated) DEVICE — SENSOR O2 FINGER ADULT

## (undated) DEVICE — KIT ENDO PROCEDURE CUST W/VLV

## (undated) DEVICE — Device

## (undated) DEVICE — RETRIEVER ROTH NET PLATINUM-UNIVERSAL

## (undated) DEVICE — NDL INJ SCLERO INTERJECT 23G

## (undated) DEVICE — SYR LUER LOK 50CC

## (undated) DEVICE — DRSG CURITY GAUZE SPONGE 4 X 4" 12-PLY

## (undated) DEVICE — SNARE LOOP POLY DISP 30MM LOOP

## (undated) DEVICE — TUBING IV SET GRAVITY 3Y 100" MACRO

## (undated) DEVICE — STERIS DEFENDO 3-PIECE KIT (AIR/WATER, SUCTION & BIOPSY VALVES)